# Patient Record
Sex: MALE | Race: WHITE | NOT HISPANIC OR LATINO | Employment: FULL TIME | URBAN - METROPOLITAN AREA
[De-identification: names, ages, dates, MRNs, and addresses within clinical notes are randomized per-mention and may not be internally consistent; named-entity substitution may affect disease eponyms.]

---

## 2017-01-12 ENCOUNTER — RESULTS ENCOUNTER (OUTPATIENT)
Dept: PAIN MEDICINE | Facility: CLINIC | Age: 52
End: 2017-01-12

## 2017-01-12 ENCOUNTER — OFFICE VISIT (OUTPATIENT)
Dept: PAIN MEDICINE | Facility: CLINIC | Age: 52
End: 2017-01-12

## 2017-01-12 VITALS
BODY MASS INDEX: 29.77 KG/M2 | DIASTOLIC BLOOD PRESSURE: 96 MMHG | TEMPERATURE: 97.5 F | HEIGHT: 73 IN | WEIGHT: 224.6 LBS | HEART RATE: 76 BPM | SYSTOLIC BLOOD PRESSURE: 145 MMHG | RESPIRATION RATE: 17 BRPM | OXYGEN SATURATION: 96 %

## 2017-01-12 DIAGNOSIS — G60.9 IDIOPATHIC PERIPHERAL NEUROPATHY: Primary | ICD-10-CM

## 2017-01-12 DIAGNOSIS — G60.9 IDIOPATHIC PERIPHERAL NEUROPATHY: ICD-10-CM

## 2017-01-12 PROCEDURE — 99204 OFFICE O/P NEW MOD 45 MIN: CPT | Performed by: PAIN MEDICINE

## 2017-01-12 RX ORDER — PREGABALIN 50 MG/1
CAPSULE ORAL
Qty: 90 CAPSULE | Refills: 0 | Status: SHIPPED | OUTPATIENT
Start: 2017-01-12 | End: 2017-02-03 | Stop reason: DRUGHIGH

## 2017-01-12 NOTE — MR AVS SNAPSHOT
[facility] PAIN MANAGEMENT  [phone]                    Tello Eller   1/12/2017 2:00 PM   Office Visit    Dept Phone:  [phone]   Encounter #:  [id number]    Provider:  Marlin Balnco MD   Department:  [facility] PAIN MANAGEMENT                Your Full Care Plan              Today's Medication Changes          These changes are accurate as of: 1/12/17  3:19 PM.  If you have any questions, ask your nurse or doctor.               New Medication(s)Ordered:     pregabalin 50 MG capsule   Commonly known as:  LYRICA   Take 1 cap PO QHS x 7 days; then increase to 1 cap BID x 7 days; then increase to 1 cap TID   Started by:  Marlin Blanco MD         Stop taking medication(s)listed here:     escitalopram 20 MG tablet   Commonly known as:  LEXAPRO   Stopped by:  Marlin Blanco MD                Where to Get Your Medications      You can get these medications from any pharmacy     Bring a paper prescription for each of these medications     pregabalin 50 MG capsule                  Your Updated Medication List          This list is accurate as of: 1/12/17  3:19 PM.  Always use your most recent med list.                atorvastatin 40 MG tablet   Commonly known as:  LIPITOR       DULoxetine 30 MG capsule   Commonly known as:  CYMBALTA   Take 1 capsule by mouth daily.       HYDROcodone-acetaminophen 7.5-325 MG per tablet   Commonly known as:  NORCO   Take 1 tablet by mouth 2 (Two) Times a Day As Needed for moderate pain (4-6).       lisinopril 20 MG tablet   Commonly known as:  PRINIVIL,ZESTRIL       pregabalin 50 MG capsule   Commonly known as:  LYRICA   Take 1 cap PO QHS x 7 days; then increase to 1 cap BID x 7 days; then increase to 1 cap TID               You Were Diagnosed With        Codes Comments    Idiopathic peripheral neuropathy    -  Primary ICD-10-CM: G60.9  ICD-9-CM: 356.9       Instructions     None    Patient Instructions History       "  Upcoming Appointments     Visit Type Date Time Department    NEW PATIENT 2017  2:00 PM [facility]      [employer] Signup     [facility] [employer] allows you to send messages to your doctor, view your test results, renew your prescriptions, schedule appointments, and more. To sign up, go to [employer] and click on the Sign Up Now link in the New User? box. Enter your [employer] Activation Code exactly as it appears below along with the last four digits of your Social Security Number and your Date of Birth () to complete the sign-up process. If you do not sign up before the expiration date, you must request a new code.    [employer] Activation Code: [email or web address]  Expires: 2017  3:19 PM    If you have questions, you can email [email or web address] or call [phone] to talk to our [employer] staff. Remember, [employer] is NOT to be used for urgent needs. For medical emergencies, dial 911.               Other Info from Your Visit           Allergies     Other        Reason for Visit     Foot Pain     Leg Pain           Vital Signs     Blood Pressure Pulse Temperature Respirations Height Weight    145/96 76 97.5 °F (36.4 °C) 17 73\" (185.4 cm) 224 lb 9.6 oz (102 kg)    Oxygen Saturation Body Mass Index Smoking Status             96% 29.63 kg/m2 Former Smoker         Problems and Diagnoses Noted     Nerve disorder        "

## 2017-01-12 NOTE — LETTER
January 16, 2017     Myles Eller MD  [address]  [address]  [facility] [address]    Patient: Tello Eller   YOB: 1965   Date of Visit: 1/12/2017       Dear Dr. Daphnie MD:    Thank you for referring Tello Eller to me for evaluation. Below are the relevant portions of my assessment and plan of care.    Tello was seen today for foot pain and leg pain.    Diagnoses and all orders for this visit:    Idiopathic peripheral neuropathy  -     pregabalin (LYRICA) 50 MG capsule; Take 1 cap PO QHS x 7 days; then increase to 1 cap BID x 7 days; then increase to 1 cap TID    Requested Prescriptions     Signed Prescriptions Disp Refills   • pregabalin (LYRICA) 50 MG capsule 90 capsule 0     Sig: Take 1 cap PO QHS x 7 days; then increase to 1 cap BID x 7 days; then increase to 1 cap TID     - Will order compound cream with ketamine 10% today.  He can place on painful areas up to 4 times a day as needed for pain.  Given side effects to gabapentin will start Lyrica 50 mg daily at bedtime and titrate up to 3 times a day.  Continue to titrate up in future as tolerated. (#90, 0 refills)   - Patient instructed regarding side effects and the need to avoid driving until they know how the medication changes affect them.     - Consider SCS placement in future. DVD given to the patient.   - UDS done today, will check on next visit.   -  Discussed with the patient narcotic use and how narcotics has not been FDA approved for peripheral neuropathy pain.  Would recommend him being on a neuropathic medication before being on a narcotic medication.  Continue Cymbalta at current dose.  Continue home exercise program.   Can consider sympathetic blocks in the future for his peripheral neuropathy.    - Body mass index is 29.63 kg/(m^2). By CDC definitions, the patient is overweight (BMI 25-29.9). Patient counseled on the importance of weight loss to help with overall health and pain control. Patient instructed to  attempt weight loss.   Plan: Calorie counting reduce portion size, cut out extra servings and reduce fast food intake patient has physical job which requires a lot of walking and physical activity.    Follow-up in 1 months.    Marlin Blanco MD  Pain Management                If you have questions, please do not hesitate to call me. I look forward to following Tello along with you.         Sincerely,        Marlin Blanco MD        CC: No Recipients
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
## 2017-01-24 RX ORDER — ATORVASTATIN CALCIUM 40 MG/1
40 TABLET, FILM COATED ORAL DAILY
Qty: 90 TABLET | Refills: 3 | Status: SHIPPED | OUTPATIENT
Start: 2017-01-24 | End: 2018-01-10 | Stop reason: SDUPTHER

## 2017-01-24 RX ORDER — LISINOPRIL 20 MG/1
20 TABLET ORAL DAILY
Qty: 90 TABLET | Refills: 3 | Status: SHIPPED | OUTPATIENT
Start: 2017-01-24 | End: 2018-01-25 | Stop reason: SDUPTHER

## 2017-01-27 RX ORDER — DULOXETIN HYDROCHLORIDE 30 MG/1
30 CAPSULE, DELAYED RELEASE ORAL DAILY
Qty: 30 CAPSULE | Refills: 5 | Status: SHIPPED | OUTPATIENT
Start: 2017-01-27 | End: 2017-06-16 | Stop reason: DRUGHIGH

## 2017-01-30 ENCOUNTER — TELEPHONE (OUTPATIENT)
Dept: PAIN MEDICINE | Facility: CLINIC | Age: 52
End: 2017-01-30

## 2017-02-03 ENCOUNTER — OFFICE VISIT (OUTPATIENT)
Dept: PAIN MEDICINE | Facility: CLINIC | Age: 52
End: 2017-02-03

## 2017-02-03 VITALS
HEIGHT: 73 IN | SYSTOLIC BLOOD PRESSURE: 142 MMHG | DIASTOLIC BLOOD PRESSURE: 88 MMHG | OXYGEN SATURATION: 94 % | BODY MASS INDEX: 29.9 KG/M2 | HEART RATE: 79 BPM | RESPIRATION RATE: 16 BRPM | TEMPERATURE: 98.2 F | WEIGHT: 225.6 LBS

## 2017-02-03 DIAGNOSIS — G60.9 IDIOPATHIC PERIPHERAL NEUROPATHY: Primary | ICD-10-CM

## 2017-02-03 PROCEDURE — 99213 OFFICE O/P EST LOW 20 MIN: CPT | Performed by: PAIN MEDICINE

## 2017-02-03 RX ORDER — PREGABALIN 150 MG/1
CAPSULE ORAL
Qty: 60 CAPSULE | Refills: 1 | Status: SHIPPED | OUTPATIENT
Start: 2017-02-03 | End: 2017-03-21

## 2017-02-03 RX ORDER — HYDROCODONE BITARTRATE AND ACETAMINOPHEN 7.5; 325 MG/1; MG/1
1 TABLET ORAL 2 TIMES DAILY PRN
Qty: 60 TABLET | Refills: 0 | Status: SHIPPED | OUTPATIENT
Start: 2017-02-03 | End: 2017-03-21 | Stop reason: SDUPTHER

## 2017-02-03 NOTE — PROGRESS NOTES
CHIEF COMPLAINT: Extremity Pain    HPI  Tello Eller is a 51 y.o. male.  He is here to follow up for Extremity Pain    Since last visit their pain has remain unchanged. He states that the compound cream was not approved by insurance. Was going to be $70 co-pay. Wants to discuss Lyrica. Tried to get Lyrica but co-pay was hundred of dollars. He is asking about a co-pay card and states the pharmacy would not approve it because it was a 3 times/day dosing. He is not sure if he's ready for SCS, wants to try other options first.     The patient states their pain is a 4 on a scale of 1-10.  The patient describes this pain as constant numbness, dull, ache, sharp, shooting, stabbing, pressure, tingling and throbbing. Burning, feet feel swollen. The pain is located in Foot: bilateral and lower legs and radiates up lower leg. This painful problem is aggravated by nothing specific, walking alot and is alleviated by nothing. 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 He states he is having more muscle spasms in the calves at night.    Past pain medications: Gabapentin/neurontin 300 mg qid- didn't help with pain, had mental issues   Lyrica (could not afford) - never taking.      Current pain medications: Hydrocodone 7.5/325 mg tid (not much benefit, helps him sleep) over taking and running out early   Cymbalta 20 mg am x 6 months  Advil prn  Aleve prn     Past therapies:  Physical Therapy: no  Chiropractor: yes  Massage Therapy: no  TENS: no  Neck or back surgery: no     Previous Injections: none    PEG Assessment   What number best describes your pain on average in the past week? 6  What number best describes how, during the past week, pain has interfered with your enjoyment of life? 5  What number best describes how, during the past week, pain has interfered with your general activity? 4      Current Outpatient Prescriptions:   •  atorvastatin (LIPITOR) 40 MG tablet, Take 1 tablet by mouth Daily., Disp: 90  tablet, Rfl: 3  •  DULoxetine (CYMBALTA) 30 MG capsule, Take 1 capsule by mouth Daily., Disp: 30 capsule, Rfl: 5  •  HYDROcodone-acetaminophen (NORCO) 7.5-325 MG per tablet, Take 1 tablet by mouth 2 (Two) Times a Day As Needed (pain)., Disp: 60 tablet, Rfl: 0  •  lisinopril (PRINIVIL,ZESTRIL) 20 MG tablet, Take 1 tablet by mouth Daily. For blood pressure, Disp: 90 tablet, Rfl: 3  •  diclofenac (VOLTAREN) 50 MG EC tablet, Take 1 tablet by mouth 2 (Two) Times a Day As Needed (pain)., Disp: 60 tablet, Rfl: 2  •  pregabalin (LYRICA) 150 MG capsule, Start with 1 capsule PO qhs x 1 week then increase to 1 capsule bid., Disp: 60 capsule, Rfl: 1    IMAGING  - Called [facility] and obtained imaging:  Lumbar MRI 10/1/2014:      [facility]:  The following portions of the patient's history were reviewed and updated as appropriate: problem list, past medical history, past surgery history, social history, family history, medications, and allergies    Review of Systems   Constitutional: Negative for activity change, appetite change, chills, diaphoresis, fatigue, fever and unexpected weight change.   HENT: Negative for ear pain, hearing loss and tinnitus.    Eyes: Positive for visual disturbance. Negative for pain.   Respiratory: Negative for apnea and shortness of breath.    Cardiovascular: Negative for chest pain and palpitations.   Gastrointestinal: Negative for abdominal pain, constipation, diarrhea, nausea and vomiting.   Genitourinary: Negative for difficulty urinating.   Musculoskeletal: Positive for arthralgias. Negative for gait problem.   Skin: Negative for rash and wound.   Allergic/Immunologic: Negative for immunocompromised state.   Neurological: Positive for weakness and numbness. Negative for dizziness, light-headedness and headaches.   Hematological: Does not bruise/bleed easily.   Psychiatric/Behavioral: Positive for agitation and sleep disturbance. Negative for confusion and suicidal ideas. The patient is  "nervous/anxious.    All other systems reviewed and are negative.      Vitals:    02/03/17 0802   BP: 142/88   Pulse: 79   Resp: 16   Temp: 98.2 °F (36.8 °C)   SpO2: 94%   Weight: 225 lb 9.6 oz (102 kg)   Height: 73\" (185.4 cm)   PainSc:   4   PainLoc: Foot       Physical Exam   Constitutional: He is oriented to person, place, and time. He appears well-developed and well-nourished. No distress.   HENT:   Head: Normocephalic and atraumatic.   Nose: Nose normal.   Mouth/Throat: Oropharynx is clear and moist.   Eyes: Conjunctivae and EOM are normal.   Neck: Normal range of motion. Neck supple.   Pulmonary/Chest: Effort normal. No stridor. No respiratory distress.   Musculoskeletal:        Right knee: He exhibits normal range of motion and no swelling. No tenderness found.        Left knee: He exhibits normal range of motion and no swelling. No tenderness found.        Right foot: There is tenderness. There is normal range of motion, no swelling and no deformity.        Left foot: There is tenderness. There is normal range of motion, no swelling and no deformity.   Neurological: He is alert and oriented to person, place, and time. He has normal strength. No cranial nerve deficit or sensory deficit.   Skin: Skin is warm and dry. No rash noted. He is not diaphoretic.   Psychiatric: He has a normal mood and affect. His speech is normal and behavior is normal.   Nursing note and vitals reviewed.    Ortho Exam  Neurologic Exam     Mental Status   Oriented to person, place, and time.   Speech: speech is normal     Cranial Nerves     CN III, IV, VI   Extraocular motions are normal.     Motor Exam     Strength   Strength 5/5 throughout.       Pain Management Panel     There is no flowsheet data to display.        Last UDS: 1/12/2017:  Comments: +norco only - Consistent       Date of last RONALD reviewed : 02/06/17   Comments: Consistent       Assessment/Plan   Tello was seen today for extremity pain.    Diagnoses and all " orders for this visit:    Idiopathic peripheral neuropathy  -     pregabalin (LYRICA) 150 MG capsule; Start with 1 capsule PO qhs x 1 week then increase to 1 capsule bid.  -     HYDROcodone-acetaminophen (NORCO) 7.5-325 MG per tablet; Take 1 tablet by mouth 2 (Two) Times a Day As Needed (pain).  -     diclofenac (VOLTAREN) 50 MG EC tablet; Take 1 tablet by mouth 2 (Two) Times a Day As Needed (pain).    Requested Prescriptions     Signed Prescriptions Disp Refills   • pregabalin (LYRICA) 150 MG capsule 60 capsule 1     Sig: Start with 1 capsule PO qhs x 1 week then increase to 1 capsule bid.   • HYDROcodone-acetaminophen (NORCO) 7.5-325 MG per tablet 60 tablet 0     Sig: Take 1 tablet by mouth 2 (Two) Times a Day As Needed (pain).   • diclofenac (VOLTAREN) 50 MG EC tablet 60 tablet 2     Sig: Take 1 tablet by mouth 2 (Two) Times a Day As Needed (pain).     - He is not sure if he's ready for SCS. Wants to try other options first.   - Will change dose of Lyrica to see if insurance will cover at bid dosing.   - Will increase dose from 50 mg to 150 mg to see if insurance will cover. START lyrica 150 mg qhs x 1 week, then increase to bid.  (#60, 0 refills)   - New patient UDS was consistent.   - START diclofenac 50 mg bid prn pain. (#60, 2 refills)   - Samples of zipsor given today  - Instructed he can not take diclofenac with aleve - must choose one or the other.   - CONTINUE hydrocodone 7.5/325 mg bid prn pain. He states this medication is ineffective but desire it to help him sleep and to take decrease the intensity of the pain. Will not increase to the amount, instructed not to self medicate. (#60, 0 refills)   - Side effects of NSAIDS explained as including but not limited to upset stomach, stomach ulcers, bleeding, kidney failure, fluid retention or high blood pressure.   - Narcotic agreement signed today   - Narcotic policy was discussed in detail. Patient is not going to get narcotic from any other physician and  will take medication as prescribed and is required to do random UDS. Patient will inform us of he gets narcotics from another physician for surgical procedure. Patient understands and agrees with the plan.   - Can consider sympathetic block in the future for his neuropathic pain.       Wt Readings from Last 3 Encounters:   02/03/17 225 lb 9.6 oz (102 kg)   01/12/17 224 lb 9.6 oz (102 kg)   12/29/16 224 lb 12.8 oz (102 kg)     Body mass index is 29.76 kg/(m^2). By CDC definitions, the patient is overweight (BMI 25-29.9). Patient counseled on the importance of weight loss to help with overall health and pain control. Patient instructed to attempt weight loss.   weight gain of 1 lbs over the past  1 month(s)    Intentional?  No  Plan: Calorie counting reduce portion size, cut out extra servings and reduce fast food intake    Follow-up in 4 weeks.    RONALD REPORT  As part of the patient's treatment plan, I am prescribing controlled substances. The patient has been made aware of appropriate use of such medications, including potential risk of somnolence, limited ability to drive and/or work safely, and the potential for dependence or overdose. It has also bee made clear that these medications are for use by this patient only, without concomitant use of alcohol or other substances unless prescribed.     Patient has completed prescribing agreement detailing terms of continued prescribing of controlled substances, including monitoring RONALD reports, urine drug screening, and pill counts if necessary. The patient is aware that inappropriate use will results in cessation of prescribing such medications.    RONALD report has been reviewed and scanned into the patient's chart.    History and physical exam exhibit continued safe and appropriate use of controlled substances.       Marlin Blanco MD  Pain Management

## 2017-02-03 NOTE — PATIENT INSTRUCTIONS
- He is not sure if he's ready for SCS.  Will consider in future.   - Will change dose to see if insurance will cover lyrica.   - Will increase dose from 50 mg to 150 mg to see if insurance will cover. (#60, 0 refills)   - New patient UDS was consistent. Will take over norco prescription.   - START diclofenac 50 mg bid prn pain. (#60, 2 refills)   - Samples given today of diclofenac  - Can not take diclofenac with aleve -   - CONTINUE hydrocodone 7.5/325 mg bid prn pain. (#60, 0 refills)   - Side effects of NSAIDS explained as including but not limited to upset stomach, stomach ulcers, bleeding, kidney failure, fluid retention or high blood pressure.

## 2017-03-21 ENCOUNTER — OFFICE VISIT (OUTPATIENT)
Dept: PAIN MEDICINE | Facility: CLINIC | Age: 52
End: 2017-03-21

## 2017-03-21 VITALS
RESPIRATION RATE: 18 BRPM | OXYGEN SATURATION: 97 % | WEIGHT: 235.8 LBS | TEMPERATURE: 97.4 F | HEART RATE: 97 BPM | HEIGHT: 73 IN | DIASTOLIC BLOOD PRESSURE: 73 MMHG | SYSTOLIC BLOOD PRESSURE: 131 MMHG | BODY MASS INDEX: 31.25 KG/M2

## 2017-03-21 DIAGNOSIS — M79.606 PAIN OF LOWER EXTREMITY, UNSPECIFIED LATERALITY: ICD-10-CM

## 2017-03-21 DIAGNOSIS — G89.29 OTHER CHRONIC PAIN: ICD-10-CM

## 2017-03-21 DIAGNOSIS — G60.9 IDIOPATHIC PERIPHERAL NEUROPATHY: Primary | ICD-10-CM

## 2017-03-21 PROCEDURE — 99214 OFFICE O/P EST MOD 30 MIN: CPT | Performed by: NURSE PRACTITIONER

## 2017-03-21 RX ORDER — DICLOFENAC POTASSIUM 25 MG/1
25 CAPSULE, LIQUID FILLED ORAL EVERY 8 HOURS SCHEDULED
Qty: 90 CAPSULE | Refills: 1 | Status: SHIPPED | OUTPATIENT
Start: 2017-03-21 | End: 2017-04-18

## 2017-03-21 RX ORDER — HYDROCODONE BITARTRATE AND ACETAMINOPHEN 7.5; 325 MG/1; MG/1
1 TABLET ORAL 2 TIMES DAILY PRN
Qty: 60 TABLET | Refills: 0 | Status: SHIPPED | OUTPATIENT
Start: 2017-03-21 | End: 2017-04-18 | Stop reason: SDUPTHER

## 2017-03-21 RX ORDER — AMITRIPTYLINE HYDROCHLORIDE 25 MG/1
25 TABLET, FILM COATED ORAL NIGHTLY
Qty: 30 TABLET | Refills: 1 | Status: SHIPPED | OUTPATIENT
Start: 2017-03-21 | End: 2017-04-18 | Stop reason: DRUGHIGH

## 2017-03-21 NOTE — PROGRESS NOTES
"CHIEF COMPLAINT  Follow-up for foot pain. Mr. Eller states that his foot pain has gotten worse in the last month. He states that he has been more active. Also has been out of medication. Missed appointment 3-3-17.    Initial evaluation by Simin Monaco   Tello Eller is a 51 y.o. male  who presents to the office for follow-up.He has a history of extremity pain due to PN.    Patient was initially evaluated by Dr. Marlin Blanco 1-12-17 for foot and leg pain and history of IPN.  Initial plan was to try the patient on compounded pain creme with ketamine.  Is also going to try Lyrica 50 mg 3 times a day.  However the compounded pain creme was not approved by his insurance.  The Lyrica is cost prohibitive.  Patient is not ready to proceed forward SCS wants to discuss other options first.  Cannot tolerate gabapentin and could not afford Lyrica.  It currently prescribed hydrocodone 7.5-325, Cymbalta 30 mg.  Plan is to change dosing of Lyrica to 150 mg twice a day and see if insurance will cover this.  Prescription given.  Start diclofenac 50 mg twice a day when necessary.  Continue hydrocodone 7.5-325 twice a day when necessary.  Follow-up in 4 weeks.    Complains of pain in his feet and legs. Today his pain is 4/10VAS. He notes continuous numbness and constant \"feeling of swelling.\"  Notes his feet are constantly numb. Pain goes up to mid-calf constantly, however can, increase to his calves up to knee as well. Pain is worst at night. Notes his leg starts jerking when his pain worsens. Takes Hydrocodone 7.5/325 2/day, Cymbalta 30 mg daily and Voltaren. He did try the Voltaren 50 mg but notes improved pain control with Zipsor samples. Has been without Hydrocodone for several weeks.  Notes mild to moderate relief with the regimen. Notes his pain is much worse without the pain medication. Denies any side effects from the regimen. ADL's by self.     AS been seen previously by Dr. Eller. Has tried " "gabapentin. Currently on Cymbalta. Does not remember trying Elavil or Amitriptylline.      Leg Pain    Incident onset: chronic- peripheral neuropathy. There was no injury mechanism. The pain is present in the left foot and right foot. The quality of the pain is described as burning. The pain is at a severity of 4/10. The pain is moderate. The pain has been worsening since onset. Associated symptoms include numbness (bilateral feet). Treatments tried: medication. The treatment provided moderate relief.      PEG Assessment   What number best describes your pain on average in the past week?6  What number best describes how, during the past week, pain has interfered with your enjoyment of life?6  What number best describes how, during the past week, pain has interfered with your general activity?  6    The following portions of the patient's history were reviewed and updated as appropriate: allergies, current medications, past family history, past medical history, past social history, past surgical history and problem list.    Review of Systems   Constitutional: Negative for fatigue.   HENT: Negative for congestion.    Eyes: Negative for visual disturbance.   Respiratory: Negative for cough, shortness of breath and wheezing.    Cardiovascular: Negative.    Gastrointestinal: Negative for constipation and diarrhea.   Genitourinary: Negative for difficulty urinating.   Musculoskeletal: Positive for joint swelling (bilateral foot swelling).        Bilateral foot pain   Neurological: Positive for numbness (bilateral feet). Negative for weakness.   Psychiatric/Behavioral: Positive for sleep disturbance. Negative for suicidal ideas. The patient is nervous/anxious.        Vitals:    03/21/17 1452   BP: 131/73   Pulse: 97   Resp: 18   Temp: 97.4 °F (36.3 °C)   SpO2: 97%   Weight: 235 lb 12.8 oz (107 kg)   Height: 73\" (185.4 cm)   PainSc:   4   PainLoc: Foot     Objective   Physical Exam   Constitutional: He is oriented to " person, place, and time. He appears well-developed and well-nourished. No distress.   HENT:   Head: Normocephalic and atraumatic.   Nose: Nose normal.   Mouth/Throat: Oropharynx is clear and moist.   Eyes: Conjunctivae and EOM are normal.   Neck: Trachea normal. Neck supple.   Pulmonary/Chest: Effort normal. No respiratory distress.   Musculoskeletal:        Right knee: He exhibits no swelling. No tenderness found.        Left knee: He exhibits no swelling. No tenderness found.        Right foot: There is tenderness. There is normal range of motion, no swelling and no deformity.        Left foot: There is tenderness. There is normal range of motion, no swelling and no deformity.   DT/TP pulses 2+/= bilaterally   Neurological: He is alert and oriented to person, place, and time. Gait normal.   Reflex Scores:       Patellar reflexes are 1+ on the right side and 1+ on the left side.  Skin: Skin is warm and dry. No rash noted. He is not diaphoretic.   Psychiatric: His speech is normal and behavior is normal.   Slightly anxious   Nursing note and vitals reviewed.          Assessment/Plan   Tello was seen today for foot pain.    Diagnoses and all orders for this visit:    Idiopathic peripheral neuropathy  -     HYDROcodone-acetaminophen (NORCO) 7.5-325 MG per tablet; Take 1 tablet by mouth 2 (Two) Times a Day As Needed (pain).    Other chronic pain    Pain of lower extremity, unspecified laterality    Other orders  -     Diclofenac Potassium (ZIPSOR) 25 MG capsule; Take 25 mg by mouth Every 8 (Eight) Hours.  -     amitriptyline (ELAVIL) 25 MG tablet; Take 1 tablet by mouth Every Night.      --- The oral drug screen confirmation from 1-12-17 has been reviewed and the result is appropriate based on patient history and RONALD report  --- Lyrica is cost-prohibitive. Will discontinue.   --- Trial of Zipsor. Discussed medication with the patient.  Included in this discussion was the potential for side effects and adverse  events.  Patient verbalized understanding and wished to proceed.  Prescription will be sent to pharmacy. Discontinue Voltaren generic.   --- Trial of Elavil 25 mg HS. Discussed medication with the patient.  Included in this discussion was the potential for side effects and adverse events.  Patient verbalized understanding and wished to proceed.  Prescription will be sent to pharmacy.  --- Refill Hydrocodone. Patient appears stable with current regimen. No adverse effects. Regarding continuation of opioids, there is no evidence of aberrant behavior or any red flags.  The patient continues with appropriate response to opioid therapy. ADL's remain intact by self.   --- Follow-up 1 month or sooner if needed.    Education about SCS therapy:   - This was an extended office visit in which we entered into discussion about advanced pain relieving techniques, and discussed implantable pain therapies. We discussed advanced neuromodulation in the form of Spinal Cord Stimulation. This is a reasonable therapy for patients who have exhausted basic nonnarcotic options, basic modalities and physical therapies, and do not have any other reasonable surgical options. This therapy as an alternative to long term high dose opioid therapy.   - Risks include but are not limited to bleeding, infection, injury, paralysis, nerve injury, dural puncture, and risk for postprocedural pain. Implanted equipment risks include but are not limited to lead migration, lead fracture, risk of loss of pain relieving stimulation, risk of electrical shock, and risk of system failure.   - We discussed the theory and basic science behind SCS therapy including but not limited to energy delivery and relevant anatomy, in terms that are easy to understand and also with use of illustrative devices. Spinal Cord Stimulation therapies apply an electromagnetic field to a specific area on the spinal cord (Dorsal Column) to attempt to block transmission of painful signals  from the peripheral nerves to the brain.   - We discussed that prior to trialing, I request that patients review relevant materials and perform some research, and also have a follow up education session with a device specialist from the . Also, insurance requires a presurgical psychological evaluation. When these have been completed, and all the patient's questions have been answered to their satisfaction, then we will plan to request authorization for trialing.   - We discussed the trialing process (aka Phase 1) that usually lasts a week, and the temporary nature of this trial. Trial success will determine whether or not we proceed to implant. We discussed reasonable expectations, and that I feel that consistent 50% pain relief if medically successful and is a reasonable expectation to justify moving forward to permanent implant.   - Additional risks of Phase 1 include but are not limited to bleeding on insertion, bleeding on lead removal, and procedural site soreness.  - We discussed the percutaneous surgical implant, including postsurgical restrictions, risks, and alternatives. For spinal cord stimulation implanted device (aka SCS Phase 2) there is usually a midline vertical incision for the spinally implanted leads, and also a horizontal incision in the posterior lumbar flank for implantation of the battery & computer (aka IPG). The leads are tunneled from the midline incision to the medial aspect of the battery pocket incision.   - Postoperative restrictions include limiting the following activity as much as possible for 90 days: Lifting >10 lbs, bending at the waist, stretching/reaching overhead, and twisting       RONALD REPORT    As part of the patient's treatment plan, I am prescribing controlled substances. The patient has been made aware of appropriate use of such medications, including potential risk of somnolence, limited ability to drive and/or work safely, and the potential for dependence  or overdose. It has also bee made clear that these medications are for use by this patient only, without concomitant use of alcohol or other substances unless prescribed.     Patient has completed prescribing agreement detailing terms of continued prescribing of controlled substances, including monitoring RONALD reports, urine drug screening, and pill counts if necessary. The patient is aware that inappropriate use will results in cessation of prescribing such medications.    RONALD report has been reviewed and scanned into the patient's chart.    Date of last RONALD : 3-17-17    History and physical exam exhibit continued safe and appropriate use of controlled substances.      EMR Dragon/Transcription disclaimer:   Much of this encounter note is an electronic transcription/translation of spoken language to printed text. The electronic translation of spoken language may permit erroneous, or at times, nonsensical words or phrases to be inadvertently transcribed; Although I have reviewed the note for such errors, some may still exist.

## 2017-04-18 ENCOUNTER — OFFICE VISIT (OUTPATIENT)
Dept: PAIN MEDICINE | Facility: CLINIC | Age: 52
End: 2017-04-18

## 2017-04-18 VITALS
HEIGHT: 73 IN | TEMPERATURE: 97.6 F | RESPIRATION RATE: 16 BRPM | HEART RATE: 90 BPM | BODY MASS INDEX: 30.64 KG/M2 | SYSTOLIC BLOOD PRESSURE: 135 MMHG | WEIGHT: 231.2 LBS | DIASTOLIC BLOOD PRESSURE: 84 MMHG | OXYGEN SATURATION: 100 %

## 2017-04-18 DIAGNOSIS — M79.606 PAIN OF LOWER EXTREMITY, UNSPECIFIED LATERALITY: ICD-10-CM

## 2017-04-18 DIAGNOSIS — G60.9 IDIOPATHIC PERIPHERAL NEUROPATHY: ICD-10-CM

## 2017-04-18 DIAGNOSIS — G89.29 OTHER CHRONIC PAIN: Primary | ICD-10-CM

## 2017-04-18 PROCEDURE — 99214 OFFICE O/P EST MOD 30 MIN: CPT | Performed by: NURSE PRACTITIONER

## 2017-04-18 RX ORDER — AMITRIPTYLINE HYDROCHLORIDE 10 MG/1
10 TABLET, FILM COATED ORAL NIGHTLY PRN
Qty: 30 TABLET | Refills: 1 | Status: SHIPPED | OUTPATIENT
Start: 2017-04-18 | End: 2017-06-16

## 2017-04-18 RX ORDER — HYDROCODONE BITARTRATE AND ACETAMINOPHEN 7.5; 325 MG/1; MG/1
1 TABLET ORAL 2 TIMES DAILY PRN
Qty: 60 TABLET | Refills: 0 | Status: SHIPPED | OUTPATIENT
Start: 2017-04-18 | End: 2017-05-18 | Stop reason: SDUPTHER

## 2017-04-18 NOTE — PROGRESS NOTES
"CHIEF COMPLAINT    Pt''s f/u for foot pain on 3/21/17. His pain has remain unchanged. States Amitriptylline did not help much although he slept better on it but it made him feel \"groggy\" when he woke up.     Subjective   Tello Eller is a 51 y.o. male  who presents to the office for follow-up.He has a history of IPN with extremity pain.    He was unable to get the Zipsor. His insurance would not cover this.    Complains of pain in his feet. Today his pain is 4/10VAS. Describes the pain as continuous.  Continues with Hydrocodone 7.5/325 2/day, Cymbalta 60 mg daily. He states he is no longer taking Diclofenac 50 mg BID or Zipsor. He did try Elavil 25 mg, which did help his sleep and pain at night, but made him feel groggy. He stopped taking this after approximately 1 week. Denies any side effects from his current medication regimen. The regimen helps decrease his pain by 50% for a few hours. He notes a significant difference from when he ran out of medication to re-starting the regimen.  ADL's by self. Has been taking OTC Nsaids.    Leg Pain    Incident onset: chronic- peripheral neuropathy. There was no injury mechanism. The pain is present in the left foot and right foot. The quality of the pain is described as burning. The pain is at a severity of 4/10. The pain is moderate. The pain has been worsening since onset. Associated symptoms include numbness (bilateral feet). Treatments tried: medication. The treatment provided moderate relief.      PEG Assessment   What number best describes your pain on average in the past week?6  What number best describes how, during the past week, pain has interfered with your enjoyment of life?6  What number best describes how, during the past week, pain has interfered with your general activity?  6    The following portions of the patient's history were reviewed and updated as appropriate: allergies, current medications, past family history, past medical history, past social " "history, past surgical history and problem list.    Review of Systems   Constitutional: Negative for fatigue.   HENT: Negative for congestion.    Eyes: Negative for visual disturbance.   Respiratory: Negative for cough, shortness of breath and wheezing.    Cardiovascular: Negative.  Negative for chest pain and palpitations.   Gastrointestinal: Negative for constipation, diarrhea and nausea.   Genitourinary: Negative for difficulty urinating.   Musculoskeletal: Positive for joint swelling (bilateral foot swelling).        Bilateral foot pain   Neurological: Positive for numbness (bilateral feet). Negative for dizziness, weakness, light-headedness and headaches.   Psychiatric/Behavioral: Positive for sleep disturbance. Negative for suicidal ideas. The patient is nervous/anxious.        Vitals:    04/18/17 0844   BP: 135/84   Pulse: 90   Resp: 16   Temp: 97.6 °F (36.4 °C)   SpO2: 100%   Weight: 231 lb 3.2 oz (105 kg)   Height: 73\" (185.4 cm)   PainSc:   4   PainLoc: Foot     Objective   Physical Exam   Constitutional: He is oriented to person, place, and time. He appears well-developed and well-nourished. No distress.   HENT:   Head: Normocephalic and atraumatic.   Nose: Nose normal.   Eyes: Conjunctivae and EOM are normal.   Neck: Trachea normal. Neck supple.   Pulmonary/Chest: Effort normal. No respiratory distress.   Musculoskeletal:   DT/TP pulses 2+/= bilaterally   Neurological: He is alert and oriented to person, place, and time. Gait normal.   Reflex Scores:       Patellar reflexes are 1+ on the right side and 1+ on the left side.  dysthesias of BLE's that stops near mid-calf   Skin: Skin is warm and dry. No rash noted. He is not diaphoretic.   Psychiatric: He has a normal mood and affect. His speech is normal and behavior is normal.   Nursing note and vitals reviewed.      Assessment/Plan   Tello was seen today for extremity pain.    Diagnoses and all orders for this visit:    Other chronic pain    Idiopathic " peripheral neuropathy  -     HYDROcodone-acetaminophen (NORCO) 7.5-325 MG per tablet; Take 1 tablet by mouth 2 (Two) Times a Day As Needed (pain).    Pain of lower extremity, unspecified laterality    Other orders  -     amitriptyline (ELAVIL) 10 MG tablet; Take 1 tablet by mouth At Night As Needed for Sleep.      --- The oral drug screen confirmation from 1-12-17 has been reviewed and the result is appropriate based on patient history and RONALD report  --- Refill Hydrocodone. Patient appears stable with current regimen. No adverse effects. Regarding continuation of opioids, there is no evidence of aberrant behavior or any red flags.  The patient continues with appropriate response to opioid therapy. ADL's remain intact by self.   --- Decrease of Amitriptylline 10 mg QHS PRN. Discussed medication with the patient.  Included in this discussion was the potential for side effects and adverse events.  Patient verbalized understanding and wished to proceed.  Prescription will be sent to pharmacy.   --- Follow-up 1 month with Dr. Blanco or sooner if needed.       RONALD REPORT    As part of the patient's treatment plan, I am prescribing controlled substances. The patient has been made aware of appropriate use of such medications, including potential risk of somnolence, limited ability to drive and/or work safely, and the potential for dependence or overdose. It has also bee made clear that these medications are for use by this patient only, without concomitant use of alcohol or other substances unless prescribed.     Patient has completed prescribing agreement detailing terms of continued prescribing of controlled substances, including monitoring RONALD reports, urine drug screening, and pill counts if necessary. The patient is aware that inappropriate use will results in cessation of prescribing such medications.    RONALD report has been reviewed and scanned into the patient's chart.    Date of last RONALD :  4-14-17    History and physical exam exhibit continued safe and appropriate use of controlled substances.      EMR Dragon/Transcription disclaimer:   Much of this encounter note is an electronic transcription/translation of spoken language to printed text. The electronic translation of spoken language may permit erroneous, or at times, nonsensical words or phrases to be inadvertently transcribed; Although I have reviewed the note for such errors, some may still exist.

## 2017-05-18 ENCOUNTER — OFFICE VISIT (OUTPATIENT)
Dept: PAIN MEDICINE | Facility: CLINIC | Age: 52
End: 2017-05-18

## 2017-05-18 VITALS
DIASTOLIC BLOOD PRESSURE: 70 MMHG | SYSTOLIC BLOOD PRESSURE: 110 MMHG | OXYGEN SATURATION: 100 % | TEMPERATURE: 98.3 F | HEIGHT: 73 IN | WEIGHT: 231 LBS | RESPIRATION RATE: 16 BRPM | HEART RATE: 69 BPM | BODY MASS INDEX: 30.62 KG/M2

## 2017-05-18 DIAGNOSIS — G60.9 IDIOPATHIC PERIPHERAL NEUROPATHY: Primary | ICD-10-CM

## 2017-05-18 PROCEDURE — 99214 OFFICE O/P EST MOD 30 MIN: CPT | Performed by: PAIN MEDICINE

## 2017-05-18 RX ORDER — HYDROCODONE BITARTRATE AND ACETAMINOPHEN 7.5; 325 MG/1; MG/1
1 TABLET ORAL 2 TIMES DAILY PRN
Qty: 60 TABLET | Refills: 0 | Status: SHIPPED | OUTPATIENT
Start: 2017-05-18 | End: 2017-06-16 | Stop reason: SDUPTHER

## 2017-06-16 ENCOUNTER — OFFICE VISIT (OUTPATIENT)
Dept: PAIN MEDICINE | Facility: CLINIC | Age: 52
End: 2017-06-16

## 2017-06-16 VITALS
TEMPERATURE: 98.2 F | BODY MASS INDEX: 31.28 KG/M2 | SYSTOLIC BLOOD PRESSURE: 124 MMHG | RESPIRATION RATE: 16 BRPM | DIASTOLIC BLOOD PRESSURE: 83 MMHG | HEIGHT: 73 IN | OXYGEN SATURATION: 95 % | HEART RATE: 84 BPM | WEIGHT: 236 LBS

## 2017-06-16 DIAGNOSIS — G60.9 IDIOPATHIC PERIPHERAL NEUROPATHY: Primary | ICD-10-CM

## 2017-06-16 PROCEDURE — 99214 OFFICE O/P EST MOD 30 MIN: CPT | Performed by: PAIN MEDICINE

## 2017-06-16 RX ORDER — DULOXETIN HYDROCHLORIDE 20 MG/1
20 CAPSULE, DELAYED RELEASE ORAL DAILY
Qty: 60 CAPSULE | Refills: 0 | Status: SHIPPED | OUTPATIENT
Start: 2017-06-16 | End: 2017-07-14

## 2017-06-16 RX ORDER — HYDROCODONE BITARTRATE AND ACETAMINOPHEN 7.5; 325 MG/1; MG/1
TABLET ORAL
Qty: 70 TABLET | Refills: 0 | Status: SHIPPED | OUTPATIENT
Start: 2017-06-16 | End: 2017-07-14 | Stop reason: SDUPTHER

## 2017-06-16 NOTE — PATIENT INSTRUCTIONS
- will decrease cymbalta slowly to see if he can wean off slowly.   - STOP cymbalta 30 mg.   - START cymbalta 20 mg daily. Take for 5 days, then decrease down to every other day, then discontinue. If increased pain, restart medication.

## 2017-06-16 NOTE — PROGRESS NOTES
CHIEF COMPLAINT: Foot Pain    HPI  Tello Eller is a 51 y.o. male.  He is here to follow up for Foot Pain     Since last visit their pain has remain unchanged.     The patient states their pain is a 4 on a scale of 1-10.  The patient describes this pain as constant numbness, dull, ache and tingling. The pain is located in bilateral feet starting at ankle. This painful problem is aggravated by physical activity, work and standing on concrete for too long and is alleviated by relaxation, pain medication and sitting, certain shoes.    Went without cymbalta over the weekend due to thinking it was not working. Increased dizziness, motion sickness, side effects stopped after resuming. No real change in pain but didn't like the way it made him feel. Wishes to discontinue to see if it his helping but does not want side effects.     Stopped Elavil due to side effects of drowsiness.     Past pain medications: Gabapentin/neurontin 300 mg qid- didn't help with pain, had mental issues   Lyrica (could not afford) - never taking.   Diclofenac/zipsor  Elavil 25 mg - drowsy  Compound cream - no approved      Current pain medications: Hydrocodone 7.5/325 mg bid-tid (not much benefit, helps him sleep) over taking and running out early - last dose was this am.   Cymbalta 30 mg am x 6 months  Elavil 10 mg  advil/aleve     Past therapies:  Physical Therapy: no  Chiropractor: yes  Massage Therapy: no  TENS: no  Neck or back surgery: no     Previous Injections: none    PEG Assessment   What number best describes your pain on average in the past week? 6  What number best describes how, during the past week, pain has interfered with your enjoyment of life? 6  What number best describes how, during the past week, pain has interfered with your general activity? 6      Current Outpatient Prescriptions:   •  atorvastatin (LIPITOR) 40 MG tablet, Take 1 tablet by mouth Daily., Disp: 90 tablet, Rfl: 3  •  HYDROcodone-acetaminophen (NORCO)  "7.5-325 MG per tablet, Take 1 tablet PO 2-3 times a day as needed for pain, Disp: 70 tablet, Rfl: 0  •  lisinopril (PRINIVIL,ZESTRIL) 20 MG tablet, Take 1 tablet by mouth Daily. For blood pressure, Disp: 90 tablet, Rfl: 3  •  diclofenac (VOLTAREN) 50 MG EC tablet, Take 1 tablet by mouth 2 (Two) Times a Day As Needed (pain)., Disp: 60 tablet, Rfl: 2  •  DULoxetine (CYMBALTA) 20 MG capsule, Take 1 capsule by mouth Daily., Disp: 60 capsule, Rfl: 0    IMAGING  [facility] for a MRI of Lumbar spine, 2 years ago     PFSH:  The following portions of the patient's history were reviewed and updated as appropriate: problem list, past medical history, past surgery history, social history, family history, medications, and allergies    Review of Systems   Constitutional: Negative for activity change, appetite change and fatigue.   HENT: Negative for congestion.    Eyes: Negative for visual disturbance.   Respiratory: Negative for cough, shortness of breath and wheezing.    Cardiovascular: Negative.  Negative for chest pain and palpitations.   Gastrointestinal: Negative for constipation, diarrhea and nausea.   Genitourinary: Negative for difficulty urinating.   Musculoskeletal: Negative for joint swelling (pt states his feet feel swollen but they are not swollen).        Bilateral foot pain   Neurological: Positive for numbness (bilateral feet). Negative for dizziness, weakness, light-headedness and headaches.   Psychiatric/Behavioral: Negative for sleep disturbance and suicidal ideas. The patient is not nervous/anxious.    All other systems reviewed and are negative.      Vitals:    06/16/17 0854   BP: 124/83   Pulse: 84   Resp: 16   Temp: 98.2 °F (36.8 °C)   SpO2: 95%   Weight: 236 lb (107 kg)   Height: 73\" (185.4 cm)   PainSc: 4  Comment: Bilateral feet pain ranges from 4-8/10   PainLoc: Foot       Physical Exam   Constitutional: He is oriented to person, place, and time. He appears well-developed and well-nourished. No " distress.   HENT:   Head: Normocephalic and atraumatic.   Nose: Nose normal.   Eyes: Conjunctivae and EOM are normal.   Neck: Trachea normal. Neck supple.   Pulmonary/Chest: Effort normal. No respiratory distress.   Musculoskeletal:        Right foot: There is tenderness. There is normal range of motion and no deformity.        Left foot: There is tenderness. There is normal range of motion and no deformity.   Neurological: He is alert and oriented to person, place, and time. Gait normal. Gait normal.   dysthesias of BLE's that stops near mid-calf   Skin: Skin is warm and dry. No rash noted. He is not diaphoretic.   Psychiatric: He has a normal mood and affect. His speech is normal and behavior is normal.   Nursing note and vitals reviewed.    Ortho Exam  Neurologic Exam     Mental Status   Oriented to person, place, and time.   Speech: speech is normal     Cranial Nerves     CN III, IV, VI   Extraocular motions are normal.     Sensory Exam   Right leg light touch: decreased from knee  Left leg light touch: decreased from knee    Gait, Coordination, and Reflexes     Gait  Gait: normal      No results found for: POCMETH, POCAMPHET, POCBARBITUR, POCBENZO, POCCOCAINE, POCMETHADO, POCOPIATES, POCOXYCODO, POCPHENCYC, POCPROPOXY, POCTHC, POCTRICYC]    Last UDS results reviewed: 06/16/17   Last UDS: 5/25/17  Comments: INCONSISTENT - NO MEDS      Date of last RONALD reviewed : 06/16/17   Comments: Consistent     Assessment/Plan   Tello was seen today for foot pain.    Diagnoses and all orders for this visit:    Idiopathic peripheral neuropathy  -     DULoxetine (CYMBALTA) 20 MG capsule; Take 1 capsule by mouth Daily.  -     diclofenac (VOLTAREN) 50 MG EC tablet; Take 1 tablet by mouth 2 (Two) Times a Day As Needed (pain).  -     HYDROcodone-acetaminophen (NORCO) 7.5-325 MG per tablet; Take 1 tablet PO 2-3 times a day as needed for pain    Requested Prescriptions     Signed Prescriptions Disp Refills   • DULoxetine  (CYMBALTA) 20 MG capsule 60 capsule 0     Sig: Take 1 capsule by mouth Daily.   • diclofenac (VOLTAREN) 50 MG EC tablet 60 tablet 2     Sig: Take 1 tablet by mouth 2 (Two) Times a Day As Needed (pain).   • HYDROcodone-acetaminophen (NORCO) 7.5-325 MG per tablet 70 tablet 0     Sig: Take 1 tablet PO 2-3 times a day as needed for pain     - will decrease cymbalta slowly to see if he can wean off slowly and without side effects.   - STOP cymbalta 30 mg  - START cymbalta 20 mg daily. Take for 5 days, then decrease down to every other day, then discontinue. If increased pain, restart medication.   - Discussed with the patient regarding the etiology of their pain. Informed them that they would likely benefit from a bilateral L4 lumbar sympathetic nerve block.  The procedure was described in detail and the risks, benefits and alternatives were discussed at the last visit. He is to call and ask about cost before proceeding. He will call to schedule.   - Last UDS performed was reviewed and INCONSISTENT.  No meds but he was out for one week. Discussed over taking his medication and how we can not properly monitor if there is no medication in his system. Will increase norco given he is requiring more but will obtain a UDS at NEXT visit and medication must be in his system.   - Random urine drug screen per office policy AT NEXT VISIT.   - Consider SCS placement in future.   - STOP elavil 10 mg due to side effects of drowsiness the next morning.   - Continue home exercise program.   - INCREASE norco 7.5/325 mg from bid to 2-3 times/day prn pain. Will increase number from #60 to #70.   - CONTINUE diclofenac 50 mg bid prn pain. (#60, 2 refills)   - Side effects of NSAIDS explained as including but not limited to upset stomach, stomach ulcers, bleeding, kidney failure, fluid retention or high blood pressure.   - Discussed with the patient regarding long-term side effects of opioids including but not limited to opioid induced  hormonal suppression, hyperalgesia, possible opioid induced altered immune system, and elevated risk of myocardial infarction.     Wt Readings from Last 3 Encounters:   06/16/17 236 lb (107 kg)   05/18/17 231 lb (105 kg)   04/18/17 231 lb 3.2 oz (105 kg)     Body mass index is 31.14 kg/(m^2). Patient counseled on the importance of weight loss to help with overall health and pain control. Patient instructed to attempt weight loss.   Plan: Calorie counting  increase physical activity, reduce portion size, cut out extra servings and reduce fast food intake    Follow-up in 1 month.    RONALD REPORT  As part of the patient's treatment plan, I am prescribing controlled substances. The patient has been made aware of appropriate use of such medications, including potential risk of somnolence, limited ability to drive and/or work safely, and the potential for dependence or overdose. It has also bee made clear that these medications are for use by this patient only, without concomitant use of alcohol or other substances unless prescribed.     Patient has completed prescribing agreement detailing terms of continued prescribing of controlled substances, including monitoring RONALD reports, urine drug screening, and pill counts if necessary. The patient is aware that inappropriate use will results in cessation of prescribing such medications.    RONALD report has been reviewed and scanned into the patient's chart.    History and physical exam exhibit continued safe and appropriate use of controlled substances.     Marlin Blanco MD  Pain Management

## 2017-07-14 ENCOUNTER — OFFICE VISIT (OUTPATIENT)
Dept: PAIN MEDICINE | Facility: CLINIC | Age: 52
End: 2017-07-14

## 2017-07-14 VITALS
WEIGHT: 234.8 LBS | BODY MASS INDEX: 31.12 KG/M2 | SYSTOLIC BLOOD PRESSURE: 127 MMHG | RESPIRATION RATE: 18 BRPM | HEART RATE: 87 BPM | OXYGEN SATURATION: 96 % | HEIGHT: 73 IN | DIASTOLIC BLOOD PRESSURE: 80 MMHG | TEMPERATURE: 96.7 F

## 2017-07-14 DIAGNOSIS — G89.29 OTHER CHRONIC PAIN: Primary | ICD-10-CM

## 2017-07-14 DIAGNOSIS — R11.0 NAUSEA: ICD-10-CM

## 2017-07-14 DIAGNOSIS — G60.9 IDIOPATHIC PERIPHERAL NEUROPATHY: ICD-10-CM

## 2017-07-14 PROCEDURE — 99213 OFFICE O/P EST LOW 20 MIN: CPT | Performed by: PAIN MEDICINE

## 2017-07-14 RX ORDER — HYDROCODONE BITARTRATE AND ACETAMINOPHEN 7.5; 325 MG/1; MG/1
TABLET ORAL
Qty: 70 TABLET | Refills: 0 | Status: SHIPPED | OUTPATIENT
Start: 2017-07-14 | End: 2017-09-08 | Stop reason: SDUPTHER

## 2017-07-14 RX ORDER — ONDANSETRON 4 MG/1
4 TABLET, FILM COATED ORAL EVERY 8 HOURS PRN
Qty: 20 TABLET | Refills: 0 | Status: SHIPPED | OUTPATIENT
Start: 2017-07-14 | End: 2017-09-08

## 2017-07-14 RX ORDER — DULOXETIN HYDROCHLORIDE 30 MG/1
CAPSULE, DELAYED RELEASE ORAL
Refills: 5 | COMMUNITY
Start: 2017-06-29 | End: 2017-07-14 | Stop reason: DRUGHIGH

## 2017-07-14 RX ORDER — DULOXETIN HYDROCHLORIDE 20 MG/1
20 CAPSULE, DELAYED RELEASE ORAL DAILY
Qty: 30 CAPSULE | Refills: 0 | Status: SHIPPED | OUTPATIENT
Start: 2017-07-14 | End: 2017-08-11

## 2017-07-14 NOTE — PROGRESS NOTES
CHIEF COMPLAINT: Foot Pain    HPI  Tello Eller is a 51 y.o. male.  He is here to follow up for Foot Pain    Since last visit their pain has remain unchanged. Increased number of tablets of norco at last visit with good results. Did not run out of medication over last month. Has a few tablets left.     The patient states their pain is a 5 on a scale of 1-10. The patient describes this pain as constant numbness, dull, ache and tingling. The pain is located in bilateral feet starting at ankle. This painful problem is aggravated by physical activity, work and standing on concrete for too long and is alleviated by relaxation, pain medication and sitting, certain shoes.    Wants to stop Cymbalta but did not receive decreased dose of 20 mg last months. He gets nauseous with decreasing dose.     Past pain medications: Gabapentin/neurontin 300 mg qid- didn't help with pain, had mental issues   Lyrica (could not afford) - never taking.   Diclofenac/zipsor  Elavil 25 mg - drowsy  Compound cream - no approved      Current pain medications: Hydrocodone 7.5/325 mg bid-tid (not much benefit, helps him sleep) over taking and running out early - last dose was this am.   Cymbalta 30 mg am x 6 months  Elavil 10 mg  advil/aleve      Past therapies:  Physical Therapy: no  Chiropractor: yes  Massage Therapy: no  TENS: no  Neck or back surgery: no      Previous Injections: none    PEG Assessment   What number best describes your pain on average in the past week? 6  What number best describes how, during the past week, pain has interfered with your enjoyment of life? 5  What number best describes how, during the past week, pain has interfered with your general activity? 6      Current Outpatient Prescriptions:   •  atorvastatin (LIPITOR) 40 MG tablet, Take 1 tablet by mouth Daily., Disp: 90 tablet, Rfl: 3  •  diclofenac (VOLTAREN) 50 MG EC tablet, Take 1 tablet by mouth 2 (Two) Times a Day As Needed (pain)., Disp: 60 tablet, Rfl: 2  •  " HYDROcodone-acetaminophen (NORCO) 7.5-325 MG per tablet, Take 1 tablet PO 2-3 times a day as needed for pain, Disp: 70 tablet, Rfl: 0  •  lisinopril (PRINIVIL,ZESTRIL) 20 MG tablet, Take 1 tablet by mouth Daily. For blood pressure, Disp: 90 tablet, Rfl: 3  •  DULoxetine (CYMBALTA) 20 MG capsule, Take 1 capsule by mouth Daily., Disp: 30 capsule, Rfl: 0  •  ondansetron (ZOFRAN) 4 MG tablet, Take 1 tablet by mouth Every 8 (Eight) Hours As Needed for Nausea or Vomiting., Disp: 20 tablet, Rfl: 0    IMAGING  [facility] for a MRI of Lumbar spine, 2 years ago     PFSH:  The following portions of the patient's history were reviewed and updated as appropriate: problem list, past medical history, past surgery history, social history, family history, medications, and allergies    Review of Systems   Constitutional: Negative for fatigue.   HENT: Negative for congestion.    Eyes: Negative for visual disturbance.   Respiratory: Negative for cough, shortness of breath and wheezing.    Cardiovascular: Negative.    Gastrointestinal: Negative for constipation and diarrhea.   Genitourinary: Negative for difficulty urinating.   Musculoskeletal: Negative for arthralgias.   Neurological: Negative for weakness and numbness.   Psychiatric/Behavioral: Negative for sleep disturbance and suicidal ideas. The patient is not nervous/anxious.        Vitals:    07/14/17 0848   BP: 127/80   Pulse: 87   Resp: 18   Temp: 96.7 °F (35.9 °C)   SpO2: 96%   Weight: 234 lb 12.8 oz (107 kg)   Height: 73\" (185.4 cm)   PainSc:   5   PainLoc: Foot       Physical Exam   Constitutional: He is oriented to person, place, and time. He appears well-developed and well-nourished. No distress.   HENT:   Head: Normocephalic and atraumatic.   Nose: Nose normal.   Eyes: Conjunctivae and EOM are normal.   Neck: Trachea normal. Neck supple.   Pulmonary/Chest: Effort normal. No respiratory distress.   Musculoskeletal:        Right foot: There is tenderness. There is normal " range of motion and no deformity.        Left foot: There is tenderness. There is normal range of motion and no deformity.   Neurological: He is alert and oriented to person, place, and time. Gait normal.   dysthesias of BLE's that stops near mid-calf   Skin: Skin is warm and dry. No rash noted. He is not diaphoretic.   Psychiatric: He has a normal mood and affect. His speech is normal and behavior is normal.   Nursing note and vitals reviewed.    Ortho Exam  Neurologic Exam     Mental Status   Oriented to person, place, and time.   Speech: speech is normal     Cranial Nerves     CN III, IV, VI   Extraocular motions are normal.       No results found for: POCMETH, POCAMPHET, POCBARBITUR, POCBENZO, POCCOCAINE, POCMETHADO, POCOPIATES, POCOXYCODO, POCPHENCYC, POCPROPOXY, POCTHC, POCTRICYC  Last UDS results reviewed: 07/17/17   Last UDS: 5/25/17  Comments: INCONSISTENT - NO MEDS    Date of last RONALD reviewed : 07/17/17   Comments: Consistent     Assessment/Plan   Tello was seen today for foot pain.    Diagnoses and all orders for this visit:    Other chronic pain  -     Oral Fluid Drug Screen; Future  -     DULoxetine (CYMBALTA) 20 MG capsule; Take 1 capsule by mouth Daily.    Idiopathic peripheral neuropathy  -     Oral Fluid Drug Screen; Future  -     HYDROcodone-acetaminophen (NORCO) 7.5-325 MG per tablet; Take 1 tablet PO 2-3 times a day as needed for pain  -     DULoxetine (CYMBALTA) 20 MG capsule; Take 1 capsule by mouth Daily.    Nausea  -     ondansetron (ZOFRAN) 4 MG tablet; Take 1 tablet by mouth Every 8 (Eight) Hours As Needed for Nausea or Vomiting.    Requested Prescriptions     Signed Prescriptions Disp Refills   • HYDROcodone-acetaminophen (NORCO) 7.5-325 MG per tablet 70 tablet 0     Sig: Take 1 tablet PO 2-3 times a day as needed for pain   • ondansetron (ZOFRAN) 4 MG tablet 20 tablet 0     Sig: Take 1 tablet by mouth Every 8 (Eight) Hours As Needed for Nausea or Vomiting.   • DULoxetine (CYMBALTA)  20 MG capsule 30 capsule 0     Sig: Take 1 capsule by mouth Daily.     - CONTINUE norco 7.5/325 mg from bid to 2-3 times/day prn pain. Will increase number from #60 to #70.   - will decrease cymbalta slowly to see if he can wean off slowly and without side effects.   - STOP cymbalta 30 mg  - START cymbalta 20 mg daily. Take for 5 days, then decrease down to every other day, then discontinue. If increased pain, restart medication.   - CONTINUE diclofenac 50 mg bid prn pain. No refills needed today.    - Side effects of NSAIDS explained as including but not limited to upset stomach, stomach ulcers, bleeding, kidney failure, fluid retention or high blood pressure.  - Discussed with the patient regarding the etiology of their pain. Informed them that they would likely benefit from a bilateral L4 lumbar sympathetic nerve block.  The procedure was described in detail and the risks, benefits and alternatives were discussed at the last visit. He is to call and ask about cost before proceeding. He will call to schedule.   - Last UDS performed was reviewed and INCONSISTENT. No meds but he was out for one week. Discussed over taking his medication and how we can not properly monitor if there is no medication in his system. Will increase norco given he is requiring more but will obtain a UDS at NEXT visit and medication must be in his system.   - UDS done today, will check on next visit. Should be positive for medication.   - Consider SCS placement in future.   - Continue home exercise program.   - Discussed with the patient regarding long-term side effects of opioids including but not limited to opioid induced hormonal suppression, hyperalgesia, possible opioid induced altered immune system, and elevated risk of myocardial infarction.     Wt Readings from Last 3 Encounters:   07/14/17 234 lb 12.8 oz (107 kg)   06/16/17 236 lb (107 kg)   05/18/17 231 lb (105 kg)     Body mass index is 30.98 kg/(m^2). Patient counseled on the  importance of weight loss to help with overall health and pain control. Patient instructed to attempt weight loss.   Plan: Calorie counting  increase physical activity, reduce portion size, cut out extra servings and reduce fast food intake    Follow-up in 1 month.    RONALD REPORT  As part of the patient's treatment plan, I am prescribing controlled substances. The patient has been made aware of appropriate use of such medications, including potential risk of somnolence, limited ability to drive and/or work safely, and the potential for dependence or overdose. It has also bee made clear that these medications are for use by this patient only, without concomitant use of alcohol or other substances unless prescribed.     Patient has completed prescribing agreement detailing terms of continued prescribing of controlled substances, including monitoring RONALD reports, urine drug screening, and pill counts if necessary. The patient is aware that inappropriate use will results in cessation of prescribing such medications.    RONALD report has been reviewed and scanned into the patient's chart.    History and physical exam exhibit continued safe and appropriate use of controlled substances.       Marlin Blanco MD  Pain Management

## 2017-08-11 ENCOUNTER — OFFICE VISIT (OUTPATIENT)
Dept: PAIN MEDICINE | Facility: CLINIC | Age: 52
End: 2017-08-11

## 2017-08-11 VITALS
RESPIRATION RATE: 16 BRPM | WEIGHT: 248.4 LBS | OXYGEN SATURATION: 99 % | SYSTOLIC BLOOD PRESSURE: 130 MMHG | DIASTOLIC BLOOD PRESSURE: 84 MMHG | HEIGHT: 73 IN | TEMPERATURE: 98 F | BODY MASS INDEX: 32.92 KG/M2 | HEART RATE: 92 BPM

## 2017-08-11 DIAGNOSIS — G89.29 OTHER CHRONIC PAIN: ICD-10-CM

## 2017-08-11 DIAGNOSIS — R68.89 WITHDRAWAL COMPLAINT: ICD-10-CM

## 2017-08-11 DIAGNOSIS — G60.9 IDIOPATHIC PERIPHERAL NEUROPATHY: Primary | ICD-10-CM

## 2017-08-11 LAB
POC AMPHETAMINES: NEGATIVE
POC BARBITURATES: NEGATIVE
POC BENZODIAZEPHINES: NEGATIVE
POC COCAINE: NEGATIVE
POC METHADONE: NEGATIVE
POC METHAMPHETAMINE SCREEN URINE: NEGATIVE
POC OPIATES: POSITIVE
POC OXYCODONE: NEGATIVE
POC PHENCYCLIDINE: NEGATIVE
POC PROPOXYPHENE: NEGATIVE
POC THC: NEGATIVE
POC TRICYCLIC ANTIDEPRESSANTS: NEGATIVE

## 2017-08-11 PROCEDURE — 99214 OFFICE O/P EST MOD 30 MIN: CPT | Performed by: PAIN MEDICINE

## 2017-08-11 PROCEDURE — 80305 DRUG TEST PRSMV DIR OPT OBS: CPT | Performed by: PAIN MEDICINE

## 2017-08-11 RX ORDER — CHLORAL HYDRATE 500 MG
CAPSULE ORAL
COMMUNITY
End: 2018-03-29 | Stop reason: HOSPADM

## 2017-08-11 RX ORDER — UBIDECARENONE 75 MG
50 CAPSULE ORAL DAILY
COMMUNITY
End: 2018-03-29 | Stop reason: HOSPADM

## 2017-08-11 NOTE — PROGRESS NOTES
CHIEF COMPLAINT: Pain    HPI  Tello Eller is a 51 y.o. male.  He is here to follow up for Pain    Since last visit their pain has worsened . Pt states he has been off cymbalta and experiencing withdrawal symptoms. Decreased cymbalta to minimal dose then down to every other day then stopped. He has been off of it for 12 days. Currently experiences insomnia, increased hunger, nausea, vertigo. Prescribed zofran for nausea but minimal help.   Now with increased aches, pains from bilateral hips down to feet but not sure if it is from cymbalta withdrawals vs weight gain of 12 pounds.     He has not been taking hydrocodone as consistently as it adds to his nausea if taken during the day. He has been using fish oil and turmeric to try to help with joint pain- purchased from [employer] started 3 days ago- no change yet.    The patient states their pain is a 8 on a scale of 1-10. The patient describes this pain as constant numbness, tingling in bilateral bilateral feet starting at ankle- this problem is not his main concern today. This painful problem is aggravated by physical activity, work and standing on concrete for too long and is alleviated by relaxation, pain medication and sitting, certain shoes.    Past pain medications: Gabapentin/neurontin 300 mg qid- didn't help with pain, had mental issues   Lyrica (could not afford) - never taking.   Diclofenac/zipsor  Elavil 25 mg - drowsy  Compound cream - no approved  Diclofenac bid - didn't work as well as aleve  Cymbalta 30 mg am x 6 months      Current pain medications:   Hydrocodone 7.5/325 mg every other day (not much benefit, helps him sleep) - last dose was this am.   Elavil 10 mg  advil/aleve      Past therapies:  Physical Therapy: no  Chiropractor: yes  Massage Therapy: no  TENS: no  Neck or back surgery: no      Previous Injections: none    PEG Assessment   What number best describes your pain on average in the past week? 8  What number best describes how, during the  past week, pain has interfered with your enjoyment of life? 8  What number best describes how, during the past week, pain has interfered with your general activity? 8      Current Outpatient Prescriptions:   •  atorvastatin (LIPITOR) 40 MG tablet, Take 1 tablet by mouth Daily., Disp: 90 tablet, Rfl: 3  •  diclofenac (VOLTAREN) 50 MG EC tablet, Take 1 tablet by mouth 2 (Two) Times a Day As Needed (pain)., Disp: 60 tablet, Rfl: 2  •  lisinopril (PRINIVIL,ZESTRIL) 20 MG tablet, Take 1 tablet by mouth Daily. For blood pressure, Disp: 90 tablet, Rfl: 3  •  Multiple Vitamins-Minerals (MULTIVITAMIN ADULT PO), Take  by mouth., Disp: , Rfl:   •  Omega-3 Fatty Acids (FISH OIL) 1000 MG capsule capsule, Take  by mouth Daily With Breakfast., Disp: , Rfl:   •  ondansetron (ZOFRAN) 4 MG tablet, Take 1 tablet by mouth Every 8 (Eight) Hours As Needed for Nausea or Vomiting., Disp: 20 tablet, Rfl: 0  •  TURMERIC PO, Take  by mouth., Disp: , Rfl:   •  vitamin B-12 (CYANOCOBALAMIN) 100 MCG tablet, Take 50 mcg by mouth Daily., Disp: , Rfl:   •  HYDROcodone-acetaminophen (NORCO) 7.5-325 MG per tablet, Take 1 tablet PO 2-3 times a day as needed for pain, Disp: 70 tablet, Rfl: 0    IMAGING  [facility] for a MRI of Lumbar spine, 2 years ago     PFSH:  The following portions of the patient's history were reviewed and updated as appropriate: problem list, past medical history, past surgery history, social history, family history, medications, and allergies    Review of Systems   Constitutional: Negative for chills, fatigue and fever.   HENT: Negative for congestion.    Eyes: Negative for visual disturbance.   Respiratory: Negative for cough, shortness of breath and wheezing.    Cardiovascular: Negative.  Negative for chest pain.   Gastrointestinal: Positive for nausea. Negative for constipation, diarrhea and vomiting.   Genitourinary: Negative for difficulty urinating.   Musculoskeletal: Positive for arthralgias.   Neurological: Positive for  "dizziness, weakness (pt thinks due to withdrawal), light-headedness, numbness (feet) and headaches.   Psychiatric/Behavioral: Negative for confusion, hallucinations, sleep disturbance and suicidal ideas. The patient is nervous/anxious.    All other systems reviewed and are negative.      Vitals:    08/11/17 0848   BP: 130/84   Pulse: 92   Resp: 16   Temp: 98 °F (36.7 °C)   SpO2: 99%   Weight: 248 lb 6.4 oz (113 kg)   Height: 73\" (185.4 cm)   PainSc:   8   PainLoc: Generalized       Physical Exam   Constitutional: He is oriented to person, place, and time. Vital signs are normal. He appears well-developed and well-nourished.  Non-toxic appearance. He appears distressed.   HENT:   Head: Normocephalic and atraumatic.   Nose: Nose normal.   Eyes: Conjunctivae and EOM are normal.   Neck: Trachea normal. Neck supple.   Pulmonary/Chest: Effort normal. No respiratory distress.   Musculoskeletal:        Right knee: Tenderness found.        Left knee: Tenderness found.        Right foot: There is tenderness. There is normal range of motion and no deformity.        Left foot: There is tenderness. There is normal range of motion and no deformity.   Neurological: He is alert and oriented to person, place, and time. Gait normal.   dysthesias of BLE's that stops near mid-calf   Skin: Skin is warm and dry. No rash noted. He is not diaphoretic.   Psychiatric: His speech is normal and behavior is normal. His mood appears anxious.   Nursing note and vitals reviewed.    Ortho Exam  Neurologic Exam     Mental Status   Oriented to person, place, and time.   Speech: speech is normal     Cranial Nerves     CN III, IV, VI   Extraocular motions are normal.       No results found for: POCMETH, POCAMPHET, POCBARBITUR, POCBENZO, POCCOCAINE, POCMETHADO, POCOPIATES, POCOXYCODO, POCPHENCYC, POCPROPOXY, POCTHC, POCTRICYC  Last UDS results reviewed: 08/11/17   Last UDS: 7/14/2017  Comments: INCONSISTENT - NO MEDS      Date of last RONALD reviewed : " 08/11/17   Comments: Consistent     Assessment/Plan   Tello was seen today for pain.    Diagnoses and all orders for this visit:    Idiopathic peripheral neuropathy    Withdrawal complaint    Other chronic pain      Requested Prescriptions      No prescriptions requested or ordered in this encounter     - Continue to stay off cymbalta. Withdrawal symptoms should continue to improve. Do not restart the medication. He has seen PCP about medication and he states there is nothing he can assist with either.   - CONTINUE norco 7.5/325 mg but continue at decreased dose of 1 every other day. Has enough medication left, does not need refill today. Hopefully it will last him another month.   - Continue aleve/ibuprofen for joint pain. Discontinue diclofenac while on aleve.   - Side effects of NSAIDS explained as including but not limited to upset stomach, stomach ulcers, bleeding, kidney failure, fluid retention or high blood pressure.  - Discussed with the patient regarding the etiology of their pain. Informed them that they would likely benefit from a bilateral L4 lumbar sympathetic nerve block.  The procedure was described in detail and the risks, benefits and alternatives were discussed at the last visit. He is to call and ask about cost before proceeding. He will call to schedule.   - Last two UDS performed was reviewed and INCONSISTENT. No meds but he was out for one week.   - UDS done today, will check on next visit. Should be positive for medication- last dose was this am.   - Complaints of generalized aches and pains. Unsure if they are from the cymbalta withdrawal vs acute increase in weight. Will let withdrawal symptoms resolve and then re-evaluate at next visit. Continue aleve for this pain.   - Consider SCS placement in future.   - Continue home exercise program.   - Discussed with the patient regarding long-term side effects of opioids including but not limited to opioid induced hormonal suppression,  hyperalgesia, possible opioid induced altered immune system, and elevated risk of myocardial infarction.     Wt Readings from Last 3 Encounters:   08/11/17 248 lb 6.4 oz (113 kg)   07/14/17 234 lb 12.8 oz (107 kg)   06/16/17 236 lb (107 kg)     Body mass index is 32.77 kg/(m^2).  Patient counseled on the importance of weight loss to help with overall health and pain control. Patient instructed to attempt weight loss.   Plan: Calorie counting  increase physical activity, reduce portion size, cut out extra servings and reduce fast food intake currently on protein diet. Increased appetite from cymbalta withdrawal.     Follow-up in 1 month.    Marlin Blanco MD  Pain Management    RONALD REPORT  As part of the patient's treatment plan, I am prescribing controlled substances. The patient has been made aware of appropriate use of such medications, including potential risk of somnolence, limited ability to drive and/or work safely, and the potential for dependence or overdose. It has also bee made clear that these medications are for use by this patient only, without concomitant use of alcohol or other substances unless prescribed.   Patient has completed prescribing agreement detailing terms of continued prescribing of controlled substances, including monitoring RONALD reports, urine drug screening, and pill counts if necessary. The patient is aware that inappropriate use will results in cessation of prescribing such medications.  RONALD report has been reviewed and scanned into the patient's chart.  History and physical exam exhibit continued safe and appropriate use of controlled substances.

## 2017-09-08 ENCOUNTER — OFFICE VISIT (OUTPATIENT)
Dept: PAIN MEDICINE | Facility: CLINIC | Age: 52
End: 2017-09-08

## 2017-09-08 VITALS
HEART RATE: 96 BPM | TEMPERATURE: 99.3 F | WEIGHT: 243 LBS | SYSTOLIC BLOOD PRESSURE: 160 MMHG | HEIGHT: 73 IN | DIASTOLIC BLOOD PRESSURE: 101 MMHG | OXYGEN SATURATION: 97 % | RESPIRATION RATE: 16 BRPM | BODY MASS INDEX: 32.2 KG/M2

## 2017-09-08 DIAGNOSIS — G60.9 IDIOPATHIC PERIPHERAL NEUROPATHY: ICD-10-CM

## 2017-09-08 PROCEDURE — 99213 OFFICE O/P EST LOW 20 MIN: CPT | Performed by: PAIN MEDICINE

## 2017-09-08 RX ORDER — PREDNISOLONE ACETATE 10 MG/ML
SUSPENSION/ DROPS OPHTHALMIC
Refills: 0 | COMMUNITY
Start: 2017-08-25 | End: 2017-10-05

## 2017-09-08 RX ORDER — HYDROCODONE BITARTRATE AND ACETAMINOPHEN 7.5; 325 MG/1; MG/1
1 TABLET ORAL 2 TIMES DAILY PRN
Qty: 60 TABLET | Refills: 0 | Status: SHIPPED | OUTPATIENT
Start: 2017-09-08 | End: 2017-10-05 | Stop reason: SDUPTHER

## 2017-09-08 RX ORDER — OFLOXACIN 3 MG/ML
SOLUTION/ DROPS OPHTHALMIC
Refills: 1 | COMMUNITY
Start: 2017-08-25 | End: 2018-03-29 | Stop reason: HOSPADM

## 2017-09-08 NOTE — PROGRESS NOTES
CHIEF COMPLAINT: Pain    HPI  Tello Eller is a 52 y.o. male.  He is here to follow up for Pain    Since last visit their pain has worsened. Pt states foot pain is worse at night. He would like prescription to take hydrocodone again because it was helping him. Tried an all natural regimen last month but no significant relief. Stopped norco to try other natural options but states they did not work and best relief has been with norco.     Has completed cymbalta and is now over withdrawal symptoms.     The patient states their pain is a 8 on a scale of 1-10. The patient describes this pain as constant numbness, tingling in bilateral bilateral feet starting at ankle. This painful problem is aggravated by physical activity, work and standing on concrete for too long and is alleviated by relaxation, pain medication and sitting, certain shoes.    Past pain medications: Gabapentin/neurontin 300 mg qid- didn't help with pain, had mental issues   Lyrica (could not afford) - never taking.   Diclofenac/zipsor  Elavil 25 mg - drowsy  Compound cream - no approved  Diclofenac bid - didn't work as well as aleve  Cymbalta 30 mg am x 6 months  Hydrocodone 7.5/325 mg every other day (not much benefit, helps him sleep) - last dose was this am.       Current pain medications:   advil/aleve      Past therapies:  Physical Therapy: no  Chiropractor: yes  Massage Therapy: no  TENS: no  Neck or back surgery: no      Previous Injections: none    PEG Assessment   What number best describes your pain on average in the past week? 6  What number best describes how, during the past week, pain has interfered with your enjoyment of life? 6  What number best describes how, during the past week, pain has interfered with your general activity? 6      Current Outpatient Prescriptions:   •  atorvastatin (LIPITOR) 40 MG tablet, Take 1 tablet by mouth Daily., Disp: 90 tablet, Rfl: 3  •  lisinopril (PRINIVIL,ZESTRIL) 20 MG tablet, Take 1 tablet by mouth  Daily. For blood pressure, Disp: 90 tablet, Rfl: 3  •  Multiple Vitamins-Minerals (MULTIVITAMIN ADULT PO), Take  by mouth., Disp: , Rfl:   •  ofloxacin (OCUFLOX) 0.3 % ophthalmic solution, PLACE 1 DROP THREE TIMES DAILY FOR 2 WEEKS THEN STOP, Disp: , Rfl: 1  •  Omega-3 Fatty Acids (FISH OIL) 1000 MG capsule capsule, Take  by mouth Daily With Breakfast., Disp: , Rfl:   •  prednisoLONE acetate (PRED FORTE) 1 % ophthalmic suspension, 1 DROP INTO RIGHT EYE 3 TIMES A DAY FOR 1 WEEK, THEN TWICE A DAY FOR 1 WEEK THEN DAILY FOR 1 WEEK, Disp: , Rfl: 0  •  vitamin B-12 (CYANOCOBALAMIN) 100 MCG tablet, Take 50 mcg by mouth Daily., Disp: , Rfl:   •  HYDROcodone-acetaminophen (NORCO) 7.5-325 MG per tablet, Take 1 tablet by mouth 2 (Two) Times a Day As Needed for Moderate Pain ., Disp: 60 tablet, Rfl: 0    IMAGING  [facility] for a MRI of Lumbar spine, 2 years ago     PFSH:  The following portions of the patient's history were reviewed and updated as appropriate: problem list, past medical history, past surgery history, social history, family history, medications, and allergies    Review of Systems   Constitutional: Negative for chills, fatigue and fever.   HENT: Negative for congestion.    Eyes: Negative for visual disturbance.   Respiratory: Negative for cough, shortness of breath and wheezing.    Cardiovascular: Negative.  Negative for chest pain.   Gastrointestinal: Negative for constipation, diarrhea, nausea and vomiting.   Genitourinary: Negative for difficulty urinating.   Musculoskeletal: Positive for arthralgias.   Neurological: Positive for dizziness and numbness (feet). Negative for weakness, light-headedness and headaches.   Psychiatric/Behavioral: Negative for confusion, hallucinations, sleep disturbance and suicidal ideas. The patient is nervous/anxious.    All other systems reviewed and are negative.      Vitals:    09/08/17 0926   BP: (!) 160/101   Pulse: 96   Resp: 16   Temp: 99.3 °F (37.4 °C)   SpO2: 97%  "  Weight: 243 lb (110 kg)   Height: 73\" (185.4 cm)   PainSc:   6   PainLoc: Foot       Physical Exam   Constitutional: He is oriented to person, place, and time. Vital signs are normal. He appears well-developed and well-nourished.  Non-toxic appearance. No distress.   HENT:   Head: Normocephalic and atraumatic.   Nose: Nose normal.   Eyes: Conjunctivae and EOM are normal.   Neck: Trachea normal. Neck supple.   Pulmonary/Chest: Effort normal. No respiratory distress.   Musculoskeletal:        Right knee: Tenderness found.        Left knee: Tenderness found.        Right foot: There is tenderness. There is normal range of motion and no deformity.        Left foot: There is tenderness. There is normal range of motion and no deformity.   Neurological: He is alert and oriented to person, place, and time. Gait normal. Gait normal.   dysthesias of BLE's that stops near mid-calf   Skin: Skin is warm and dry. No rash noted. He is not diaphoretic.   Psychiatric: His speech is normal and behavior is normal. His mood appears anxious.   Nursing note and vitals reviewed.    Ortho Exam  Neurologic Exam     Mental Status   Oriented to person, place, and time.   Speech: speech is normal     Cranial Nerves     CN III, IV, VI   Extraocular motions are normal.     Gait, Coordination, and Reflexes     Gait  Gait: normal      Lab Results   Component Value Date    POCMETH Negative 08/11/2017    POCAMPHET Negative 08/11/2017    POCBARBITUR Negative 08/11/2017    POCBENZO Negative 08/11/2017    POCCOCAINE Negative 08/11/2017    POCMETHADO Negative 08/11/2017    POCOPIATES Positive (A) 08/11/2017    POCOXYCODO Negative 08/11/2017    POCPHENCYC Negative 08/11/2017    POCPROPOXY Negative 08/11/2017    POCTHC Negative 08/11/2017    POCTRICYC Negative 08/11/2017     Last UDS results reviewed: 09/08/17   Last UDS: 8/11/2017  Comments: Consistent       Date of last RONALD reviewed : 09/08/17   Comments: Consistent     Assessment/Plan   Tello " was seen today for pain.    Diagnoses and all orders for this visit:    Idiopathic peripheral neuropathy  -     HYDROcodone-acetaminophen (NORCO) 7.5-325 MG per tablet; Take 1 tablet by mouth 2 (Two) Times a Day As Needed for Moderate Pain .    Requested Prescriptions     Signed Prescriptions Disp Refills   • HYDROcodone-acetaminophen (NORCO) 7.5-325 MG per tablet 60 tablet 0     Sig: Take 1 tablet by mouth 2 (Two) Times a Day As Needed for Moderate Pain .     - Continue to stay off cymbalta.   - Will restart norco but at a lower dose of 5/325 mg bid instead of 7.5/325 mg bid.  Given he has been off norco for 1 month will start at lower dose, should be more effective.   - Continue aleve/ibuprofen for joint pain. Discontinue diclofenac while on aleve.   - Side effects of NSAIDS explained as including but not limited to upset stomach, stomach ulcers, bleeding, kidney failure, fluid retention or high blood pressure.  - Labs reviewed. Last CMP was 10/2016 - normal kidney function.   - Discussed with the patient regarding the etiology of their pain. Informed them that they would likely benefit from a bilateral L4 lumbar sympathetic nerve block.  Patient wishes to hold off on any procedures at this time.   - Last UDS performed was reviewed and consistent.  Has two UDS performed was reviewed and INCONSISTENT. No meds but he was out for one week. Will monitor closely. Any more abnormal UDS will result in discharge.   - Consider SCS placement in future for peripheral neuropathy.   - Continue home exercise program.   - Discussed with the patient regarding long-term side effects of opioids including but not limited to opioid induced hormonal suppression, hyperalgesia, possible opioid induced altered immune system, and elevated risk of myocardial infarction.    Wt Readings from Last 3 Encounters:   09/08/17 243 lb (110 kg)   08/11/17 248 lb 6.4 oz (113 kg)   07/14/17 234 lb 12.8 oz (107 kg)     Body mass index is 32.06  kg/(m^2). weight loss of 5 lbs over the past 1 month(s)    Intentional?  Yes Patient counseled on the importance of weight loss to help with overall health and pain control. Patient instructed to attempt weight loss.   Plan: Calorie counting  increase physical activity, reduce portion size, cut out extra servings and reduce fast food intake trying to eat less processed food, more natural ingredients.     Follow-up in 1 month.    Marlin Blanco MD  Pain Management      RONALD REPORT  As part of the patient's treatment plan, I am prescribing controlled substances. The patient has been made aware of appropriate use of such medications, including potential risk of somnolence, limited ability to drive and/or work safely, and the potential for dependence or overdose. It has also bee made clear that these medications are for use by this patient only, without concomitant use of alcohol or other substances unless prescribed.   Patient has completed prescribing agreement detailing terms of continued prescribing of controlled substances, including monitoring RONALD reports, urine drug screening, and pill counts if necessary. The patient is aware that inappropriate use will results in cessation of prescribing such medications.  RONALD report has been reviewed and scanned into the patient's chart.  History and physical exam exhibit continued safe and appropriate use of controlled substances.

## 2017-10-05 ENCOUNTER — OFFICE VISIT (OUTPATIENT)
Dept: PAIN MEDICINE | Facility: CLINIC | Age: 52
End: 2017-10-05

## 2017-10-05 VITALS
WEIGHT: 246 LBS | SYSTOLIC BLOOD PRESSURE: 141 MMHG | BODY MASS INDEX: 32.6 KG/M2 | HEART RATE: 83 BPM | OXYGEN SATURATION: 96 % | RESPIRATION RATE: 18 BRPM | HEIGHT: 73 IN | DIASTOLIC BLOOD PRESSURE: 91 MMHG | TEMPERATURE: 98.3 F

## 2017-10-05 DIAGNOSIS — G89.29 OTHER CHRONIC PAIN: Primary | ICD-10-CM

## 2017-10-05 DIAGNOSIS — G60.9 IDIOPATHIC PERIPHERAL NEUROPATHY: ICD-10-CM

## 2017-10-05 PROCEDURE — 99213 OFFICE O/P EST LOW 20 MIN: CPT | Performed by: PAIN MEDICINE

## 2017-10-05 RX ORDER — HYDROCODONE BITARTRATE AND ACETAMINOPHEN 7.5; 325 MG/1; MG/1
1 TABLET ORAL 2 TIMES DAILY PRN
Qty: 60 TABLET | Refills: 0 | Status: SHIPPED | OUTPATIENT
Start: 2017-10-05 | End: 2017-12-01 | Stop reason: SDUPTHER

## 2017-10-05 NOTE — PROGRESS NOTES
CHIEF COMPLAINT: Foot Pain    HPI  Tello Eller is a 52 y.o. male.  He is here to follow up for Foot Pain  .  Tello Eller is a 52 y.o. male  who presents to the office for follow-up.  Since last visit their pain has remain unchanged.  Feels his pain has been better controlled over the last month with restarting his hydrocodone medication.  The previous month he went without thinking he could do and on natural pain regimen that had increased pain.    The patient states their pain is a 7/8 on a scale of 1-10. The patient describes this pain as constant numbness, tingling in bilateral bilateral feet starting at ankle. This painful problem is aggravated by physical activity, work and standing on concrete for too long and is alleviated by relaxation, pain medication and sitting, certain shoes.  Her last several weeks feels more achiness in his bilateral ankles, worse in the mornings, improved with movement.    Past pain medications: Gabapentin/neurontin 300 mg qid- didn't help with pain, had mental issues   Lyrica (could not afford) - never taking.   Diclofenac/zipsor  Elavil 25 mg - drowsy  Compound cream - no approved  Diclofenac bid - didn't work as well as aleve  Cymbalta 30 mg am x 6 months  Hydrocodone 7.5/325 mg every other day (not much benefit, helps him sleep) - last dose was this am.       Current pain medications:   Advil/aleve back and body  norco bid      Past therapies:  Physical Therapy: no  Chiropractor: yes  Massage Therapy: no  TENS: no  Neck or back surgery: no      Previous Injections: none    PEG Assessment   What number best describes your pain on average in the past week? 8  What number best describes how, during the past week, pain has interfered with your enjoyment of life? 8  What number best describes how, during the past week, pain has interfered with your general activity? 8      Current Outpatient Prescriptions:   •  atorvastatin (LIPITOR) 40 MG tablet, Take 1 tablet by mouth  "Daily., Disp: 90 tablet, Rfl: 3  •  HYDROcodone-acetaminophen (NORCO) 7.5-325 MG per tablet, Take 1 tablet by mouth 2 (Two) Times a Day As Needed for Moderate Pain ., Disp: 60 tablet, Rfl: 0  •  lisinopril (PRINIVIL,ZESTRIL) 20 MG tablet, Take 1 tablet by mouth Daily. For blood pressure, Disp: 90 tablet, Rfl: 3  •  Multiple Vitamins-Minerals (MULTIVITAMIN ADULT PO), Take  by mouth., Disp: , Rfl:   •  ofloxacin (OCUFLOX) 0.3 % ophthalmic solution, PLACE 1 DROP THREE TIMES DAILY FOR 2 WEEKS THEN STOP, Disp: , Rfl: 1  •  Omega-3 Fatty Acids (FISH OIL) 1000 MG capsule capsule, Take  by mouth Daily With Breakfast., Disp: , Rfl:   •  vitamin B-12 (CYANOCOBALAMIN) 100 MCG tablet, Take 50 mcg by mouth Daily., Disp: , Rfl:   •  HYDROcodone-acetaminophen (NORCO) 7.5-325 MG per tablet, Take 1 tablet by mouth 2 (Two) Times a Day As Needed for Moderate Pain ., Disp: 60 tablet, Rfl: 0    IMAGING  [facility] for a MRI of Lumbar spine, 2 years ago     PFSH:  The following portions of the patient's history were reviewed and updated as appropriate: problem list, past medical history, past surgery history, social history, family history, medications, and allergies    Review of Systems   Constitutional: Negative for fatigue.   HENT: Negative for congestion.    Eyes: Negative for visual disturbance.   Respiratory: Negative for cough, shortness of breath and wheezing.    Cardiovascular: Negative.    Gastrointestinal: Negative for constipation and diarrhea.   Genitourinary: Negative for difficulty urinating.   Musculoskeletal: Positive for arthralgias (bilateral ankles and feet).   Neurological: Negative for weakness and numbness.   Psychiatric/Behavioral: Negative for sleep disturbance and suicidal ideas. The patient is not nervous/anxious.        Vitals:    10/05/17 0847   BP: 141/91   Pulse: 83   Resp: 18   Temp: 98.3 °F (36.8 °C)   SpO2: 96%   Weight: 246 lb (112 kg)   Height: 73\" (185.4 cm)   PainSc:   8   PainLoc: Foot       Physical " Exam   Constitutional: He is oriented to person, place, and time. Vital signs are normal. He appears well-developed and well-nourished.  Non-toxic appearance. No distress.   HENT:   Head: Normocephalic and atraumatic.   Nose: Nose normal.   Eyes: Conjunctivae and EOM are normal.   Neck: Trachea normal. Neck supple.   Pulmonary/Chest: Effort normal. No respiratory distress.   Musculoskeletal:        Right knee: Tenderness found.        Left knee: Tenderness found.        Right ankle: He exhibits normal range of motion and no swelling. No tenderness.        Left ankle: He exhibits normal range of motion and no swelling. No tenderness.        Right foot: There is tenderness. There is normal range of motion and no deformity.        Left foot: There is tenderness. There is normal range of motion and no deformity.   Neurological: He is alert and oriented to person, place, and time. Gait normal. Gait normal.   dysthesias of BLE's that stops near mid-calf   Skin: Skin is warm and dry. No rash noted. He is not diaphoretic.   Psychiatric: His speech is normal and behavior is normal. His mood appears anxious.   Nursing note and vitals reviewed.    Ortho Exam  Neurologic Exam     Mental Status   Oriented to person, place, and time.   Speech: speech is normal     Cranial Nerves     CN III, IV, VI   Extraocular motions are normal.     Gait, Coordination, and Reflexes     Gait  Gait: normal      Lab Results   Component Value Date    POCMETH Negative 08/11/2017    POCAMPHET Negative 08/11/2017    POCBARBITUR Negative 08/11/2017    POCBENZO Negative 08/11/2017    POCCOCAINE Negative 08/11/2017    POCMETHADO Negative 08/11/2017    POCOPIATES Positive (A) 08/11/2017    POCOXYCODO Negative 08/11/2017    POCPHENCYC Negative 08/11/2017    POCPROPOXY Negative 08/11/2017    POCTHC Negative 08/11/2017    POCTRICYC Negative 08/11/2017     Last UDS results reviewed: 10/05/17   Last UDS: 8/11/2017  Comments: Consistent       Date of last  RONALD reviewed : 10/05/17   Comments: Consistent     Assessment/Plan   Tello was seen today for foot pain.    Diagnoses and all orders for this visit:    Other chronic pain  -     HYDROcodone-acetaminophen (NORCO) 7.5-325 MG per tablet; Take 1 tablet by mouth 2 (Two) Times a Day As Needed for Moderate Pain .  -     HYDROcodone-acetaminophen (NORCO) 7.5-325 MG per tablet; Take 1 tablet by mouth 2 (Two) Times a Day As Needed for Moderate Pain .    Idiopathic peripheral neuropathy  -     HYDROcodone-acetaminophen (NORCO) 7.5-325 MG per tablet; Take 1 tablet by mouth 2 (Two) Times a Day As Needed for Moderate Pain .  -     HYDROcodone-acetaminophen (NORCO) 7.5-325 MG per tablet; Take 1 tablet by mouth 2 (Two) Times a Day As Needed for Moderate Pain .      Requested Prescriptions     Signed Prescriptions Disp Refills   • HYDROcodone-acetaminophen (NORCO) 7.5-325 MG per tablet 60 tablet 0     Sig: Take 1 tablet by mouth 2 (Two) Times a Day As Needed for Moderate Pain .   • HYDROcodone-acetaminophen (NORCO) 7.5-325 MG per tablet 60 tablet 0     Sig: Take 1 tablet by mouth 2 (Two) Times a Day As Needed for Moderate Pain .     - Patient appears stable with current regimen. No adverse effects. Regarding continuation of opioids, there is no evidence of aberrant behavior or any red flags. The patient continues with appropriate response to opioid therapy. ADL's remain intact by self.    - Continue norco of 7.5/325 mg bid.  (#60, 1 refills)    - Two months prescriptions given today. Appropriate DNF dates applied.    - Continue aleve/ibuprofen for joint pain. Discontinue diclofenac while on aleve.   - Side effects of NSAIDS explained as including but not limited to upset stomach, stomach ulcers, bleeding, kidney failure, fluid retention or high blood pressure.  - Last UDS performed was reviewed and consistent.  Has two UDS performed was reviewed and INCONSISTENT. No meds but he was out for one week. Will monitor closely. Any  more abnormal UDS will result in discharge. Random urine drug screen per office policy AT NEXT VISIT.   - Continue home exercise program.   - Discussed with the patient regarding long-term side effects of opioids including but not limited to opioid induced hormonal suppression, hyperalgesia, possible opioid induced altered immune system, and elevated risk of myocardial infarction.    Wt Readings from Last 3 Encounters:   10/05/17 246 lb (112 kg)   09/08/17 243 lb (110 kg)   08/11/17 248 lb 6.4 oz (113 kg)     Body mass index is 32.46 kg/(m^2). By CDC definitions, this patient is obese (BMI >= 30). Patient counseled on the importance of weight loss to help with overall health and pain control. Patient instructed to attempt weight loss.   Plan: Calorie counting  increase physical activity patient has gained 3 pounds over last month.  Goal of losing 3 pounds over next month.    Follow-up in 2 months.    Marlin Blanco MD  Pain Management      RONALD REPORT  As part of the patient's treatment plan, I am prescribing controlled substances. The patient has been made aware of appropriate use of such medications, including potential risk of somnolence, limited ability to drive and/or work safely, and the potential for dependence or overdose. It has also bee made clear that these medications are for use by this patient only, without concomitant use of alcohol or other substances unless prescribed.   Patient has completed prescribing agreement detailing terms of continued prescribing of controlled substances, including monitoring RONALD reports, urine drug screening, and pill counts if necessary. The patient is aware that inappropriate use will results in cessation of prescribing such medications.  RONALD report has been reviewed and scanned into the patient's chart.  History and physical exam exhibit continued safe and appropriate use of controlled substances.

## 2017-12-01 ENCOUNTER — OFFICE VISIT (OUTPATIENT)
Dept: PAIN MEDICINE | Facility: CLINIC | Age: 52
End: 2017-12-01

## 2017-12-01 VITALS
TEMPERATURE: 98.3 F | DIASTOLIC BLOOD PRESSURE: 73 MMHG | HEIGHT: 72 IN | SYSTOLIC BLOOD PRESSURE: 115 MMHG | WEIGHT: 245 LBS | OXYGEN SATURATION: 95 % | RESPIRATION RATE: 18 BRPM | BODY MASS INDEX: 33.18 KG/M2 | HEART RATE: 75 BPM

## 2017-12-01 DIAGNOSIS — G60.9 IDIOPATHIC PERIPHERAL NEUROPATHY: ICD-10-CM

## 2017-12-01 DIAGNOSIS — G89.29 OTHER CHRONIC PAIN: ICD-10-CM

## 2017-12-01 PROCEDURE — 99213 OFFICE O/P EST LOW 20 MIN: CPT | Performed by: PAIN MEDICINE

## 2017-12-01 RX ORDER — MELOXICAM 15 MG/1
15 TABLET ORAL DAILY
Qty: 30 TABLET | Refills: 5 | Status: SHIPPED | OUTPATIENT
Start: 2017-12-01 | End: 2018-03-29 | Stop reason: HOSPADM

## 2017-12-01 RX ORDER — HYDROCODONE BITARTRATE AND ACETAMINOPHEN 7.5; 325 MG/1; MG/1
1 TABLET ORAL 2 TIMES DAILY PRN
Qty: 60 TABLET | Refills: 0 | Status: SHIPPED | OUTPATIENT
Start: 2017-12-01 | End: 2018-01-30 | Stop reason: SDUPTHER

## 2017-12-01 NOTE — PROGRESS NOTES
CHIEF COMPLAINT: Foot Pain (bilateral)    HPI  Tello Eller is a 52 y.o. male.  He is here to follow up for Foot Pain (bilateral)  .  Tello Eller is a 52 y.o. male  who presents to the office for follow-up. Since last visit their pain has remain unchanged. Stable on current regimen. With no side effects. Wanting to get 2nd medical opinion on worsening pain to see if there is treatment.     The patient states their pain is a 5 on a scale of 1-10.  The patient describes this pain as constant tingling, numbness and shooting.  The pain is located in bilateral foot and does not radiate. This painful problem is aggravated by physical activity and work and is alleviated by pain medication, relaxation and sitting.    Past pain medications: Gabapentin/neurontin 300 mg qid- didn't help with pain, had mental issues   Lyrica (could not afford) - never taking.   Diclofenac/zipsor  Elavil 25 mg - drowsy  Compound cream - no approved  Diclofenac bid - didn't work as well as aleve  Cymbalta 30 mg am x 6 months  Hydrocodone 7.5/325 mg every other day (not much benefit, helps him sleep) - last dose was this am.       Current pain medications:   Advil/aleve back and body  norco bid - help      Past therapies:  Physical Therapy: no  Chiropractor: yes  Massage Therapy: no  TENS: no  Neck or back surgery: no      Previous Injections: none    PEG Assessment   What number best describes your pain on average in the past week? 6  What number best describes how, during the past week, pain has interfered with your enjoyment of life? 6  What number best describes how, during the past week, pain has interfered with your general activity? 6      Current Outpatient Prescriptions:   •  atorvastatin (LIPITOR) 40 MG tablet, Take 1 tablet by mouth Daily., Disp: 90 tablet, Rfl: 3  •  HYDROcodone-acetaminophen (NORCO) 7.5-325 MG per tablet, Take 1 tablet by mouth 2 (Two) Times a Day As Needed for Moderate Pain ., Disp: 60 tablet, Rfl: 0  •   "HYDROcodone-acetaminophen (NORCO) 7.5-325 MG per tablet, Take 1 tablet by mouth 2 (Two) Times a Day As Needed for Moderate Pain ., Disp: 60 tablet, Rfl: 0  •  lisinopril (PRINIVIL,ZESTRIL) 20 MG tablet, Take 1 tablet by mouth Daily. For blood pressure, Disp: 90 tablet, Rfl: 3  •  Multiple Vitamins-Minerals (MULTIVITAMIN ADULT PO), Take  by mouth., Disp: , Rfl:   •  ofloxacin (OCUFLOX) 0.3 % ophthalmic solution, PLACE 1 DROP THREE TIMES DAILY FOR 2 WEEKS THEN STOP, Disp: , Rfl: 1  •  Omega-3 Fatty Acids (FISH OIL) 1000 MG capsule capsule, Take  by mouth Daily With Breakfast., Disp: , Rfl:   •  vitamin B-12 (CYANOCOBALAMIN) 100 MCG tablet, Take 50 mcg by mouth Daily., Disp: , Rfl:   •  meloxicam (MOBIC) 15 MG tablet, Take 1 tablet by mouth Daily., Disp: 30 tablet, Rfl: 5    PFSH:  The following portions of the patient's history were reviewed and updated as appropriate: problem list, past medical history, past surgery history, social history, family history, medications, and allergies    Review of Systems   Constitutional: Negative for chills and fatigue.   Respiratory: Negative for shortness of breath.    Cardiovascular: Negative for chest pain.   Gastrointestinal: Negative for constipation, diarrhea, nausea and vomiting.   Genitourinary: Negative for difficulty urinating, dysuria and enuresis.   Musculoskeletal:        Bilateral foot pain   Neurological: Positive for numbness. Negative for dizziness, weakness, light-headedness and headaches.   Psychiatric/Behavioral: Negative for confusion, hallucinations, self-injury, sleep disturbance and suicidal ideas. The patient is not nervous/anxious.        Vitals:    12/01/17 0804   BP: 115/73   Pulse: 75   Resp: 18   Temp: 98.3 °F (36.8 °C)   SpO2: 95%   Weight: 245 lb (111 kg)   Height: 72\" (182.9 cm)   PainSc:   5   PainLoc: Foot       Physical Exam   Constitutional: He is oriented to person, place, and time. Vital signs are normal. He appears well-developed and " well-nourished.  Non-toxic appearance. No distress.   HENT:   Head: Normocephalic and atraumatic.   Nose: Nose normal.   Eyes: Conjunctivae and EOM are normal.   Neck: Trachea normal. Neck supple.   Pulmonary/Chest: Effort normal. No respiratory distress.   Musculoskeletal:        Right knee: Tenderness found.        Left knee: Tenderness found.        Right ankle: He exhibits normal range of motion and no swelling. No tenderness.        Left ankle: He exhibits normal range of motion and no swelling. No tenderness.        Right foot: There is tenderness. There is normal range of motion and no deformity.        Left foot: There is tenderness. There is normal range of motion and no deformity.   Neurological: He is alert and oriented to person, place, and time. Gait normal.   dysthesias of BLE's that stops near mid-calf   Skin: Skin is warm and dry. No rash noted. He is not diaphoretic.   Psychiatric: His speech is normal and behavior is normal. His mood appears anxious.   Nursing note and vitals reviewed.    Ortho Exam  Neurologic Exam     Mental Status   Oriented to person, place, and time.   Speech: speech is normal     Cranial Nerves     CN III, IV, VI   Extraocular motions are normal.     Sensory Exam   Right leg light touch: decreased from ankle  Left leg light touch: decreased from ankle      Lab Results   Component Value Date    POCMETH Negative 08/11/2017    POCAMPHET Negative 08/11/2017    POCBARBITUR Negative 08/11/2017    POCBENZO Negative 08/11/2017    POCCOCAINE Negative 08/11/2017    POCMETHADO Negative 08/11/2017    POCOPIATES Positive (A) 08/11/2017    POCOXYCODO Negative 08/11/2017    POCPHENCYC Negative 08/11/2017    POCPROPOXY Negative 08/11/2017    POCTHC Negative 08/11/2017    POCTRICYC Negative 08/11/2017     Last UDS results reviewed: 12/01/17   Last UDS: 8/11/2017  Comments: Consistent       Date of last RONALD reviewed : 12/01/17   Comments: Consistent     Assessment/Plan   Tello was seen  today for foot pain.    Diagnoses and all orders for this visit:    Idiopathic peripheral neuropathy  -     Oral Fluid Drug Screen - Saliva, Oral Cavity; Future  -     HYDROcodone-acetaminophen (NORCO) 7.5-325 MG per tablet; Take 1 tablet by mouth 2 (Two) Times a Day As Needed for Moderate Pain .  -     HYDROcodone-acetaminophen (NORCO) 7.5-325 MG per tablet; Take 1 tablet by mouth 2 (Two) Times a Day As Needed for Moderate Pain .  -     meloxicam (MOBIC) 15 MG tablet; Take 1 tablet by mouth Daily.    Other chronic pain  -     HYDROcodone-acetaminophen (NORCO) 7.5-325 MG per tablet; Take 1 tablet by mouth 2 (Two) Times a Day As Needed for Moderate Pain .  -     HYDROcodone-acetaminophen (NORCO) 7.5-325 MG per tablet; Take 1 tablet by mouth 2 (Two) Times a Day As Needed for Moderate Pain .      Requested Prescriptions     Signed Prescriptions Disp Refills   • HYDROcodone-acetaminophen (NORCO) 7.5-325 MG per tablet 60 tablet 0     Sig: Take 1 tablet by mouth 2 (Two) Times a Day As Needed for Moderate Pain .   • HYDROcodone-acetaminophen (NORCO) 7.5-325 MG per tablet 60 tablet 0     Sig: Take 1 tablet by mouth 2 (Two) Times a Day As Needed for Moderate Pain .   • meloxicam (MOBIC) 15 MG tablet 30 tablet 5     Sig: Take 1 tablet by mouth Daily.     - Patient appears stable with current regimen. No adverse effects. Regarding continuation of opioids, there is no evidence of aberrant behavior or any red flags. The patient continues with appropriate response to opioid therapy. ADL's remain intact by self.    - Continue norco of 7.5/325 mg bid.  (#60, 1 refills)    - Two months prescriptions given today. Appropriate DNF dates applied.    - Continue aleve/ibuprofen for joint pain. Wishes to try another prescription for NSAID. Tried diclofenac in past, minimal help will prescribe mobic to see if he can get any relief.   - Side effects of NSAIDS explained as including but not limited to upset stomach, stomach ulcers, bleeding,  kidney failure, fluid retention or high blood pressure.  - Last UDS performed was reviewed and consistent.  Has two UDS performed was reviewed and INCONSISTENT. No meds but he was out for one week. Will monitor closely. Any more abnormal UDS will result in discharge. Random urine drug screen per office policy today, to be checked at next visit.   - Continue home exercise program.   - Discussed with the patient regarding long-term side effects of opioids including but not limited to opioid induced hormonal suppression, hyperalgesia, possible opioid induced altered immune system, and elevated risk of myocardial infarction.    Wt Readings from Last 3 Encounters:   12/01/17 245 lb (111 kg)   10/05/17 246 lb (112 kg)   09/08/17 243 lb (110 kg)     Body mass index is 33.23 kg/(m^2). Patient counseled on the importance of weight loss to help with overall health and pain control. Patient instructed to attempt weight loss.   Plan: Calorie counting  increase physical activity    Follow-up in 2 months.    Marlin Blanco MD  Pain Management    RONALD REPORT  As part of the patient's treatment plan, I am prescribing controlled substances. The patient has been made aware of appropriate use of such medications, including potential risk of somnolence, limited ability to drive and/or work safely, and the potential for dependence or overdose. It has also bee made clear that these medications are for use by this patient only, without concomitant use of alcohol or other substances unless prescribed.   Patient has completed prescribing agreement detailing terms of continued prescribing of controlled substances, including monitoring RONALD reports, urine drug screening, and pill counts if necessary. The patient is aware that inappropriate use will results in cessation of prescribing such medications.  RONALD report has been reviewed and scanned into the patient's chart.  History and physical exam exhibit continued safe and  appropriate use of controlled substances.

## 2018-01-10 RX ORDER — ATORVASTATIN CALCIUM 40 MG/1
TABLET, FILM COATED ORAL
Qty: 90 TABLET | Refills: 3 | Status: SHIPPED | OUTPATIENT
Start: 2018-01-10 | End: 2018-01-25 | Stop reason: SDUPTHER

## 2018-01-22 RX ORDER — LISINOPRIL 20 MG/1
TABLET ORAL
Qty: 90 TABLET | Refills: 3 | OUTPATIENT
Start: 2018-01-22

## 2018-01-25 ENCOUNTER — OFFICE VISIT (OUTPATIENT)
Dept: FAMILY MEDICINE CLINIC | Facility: CLINIC | Age: 53
End: 2018-01-25

## 2018-01-25 VITALS
SYSTOLIC BLOOD PRESSURE: 110 MMHG | RESPIRATION RATE: 16 BRPM | DIASTOLIC BLOOD PRESSURE: 70 MMHG | HEART RATE: 99 BPM | BODY MASS INDEX: 33.66 KG/M2 | HEIGHT: 72 IN | WEIGHT: 248.5 LBS | OXYGEN SATURATION: 94 %

## 2018-01-25 DIAGNOSIS — G60.9 IDIOPATHIC PERIPHERAL NEUROPATHY: ICD-10-CM

## 2018-01-25 DIAGNOSIS — Z12.5 PROSTATE CANCER SCREENING: ICD-10-CM

## 2018-01-25 DIAGNOSIS — Z00.00 HEALTH CARE MAINTENANCE: Primary | ICD-10-CM

## 2018-01-25 DIAGNOSIS — Z12.11 COLON CANCER SCREENING: ICD-10-CM

## 2018-01-25 DIAGNOSIS — R53.82 CHRONIC FATIGUE: ICD-10-CM

## 2018-01-25 DIAGNOSIS — I10 ESSENTIAL HYPERTENSION: ICD-10-CM

## 2018-01-25 DIAGNOSIS — E78.00 PURE HYPERCHOLESTEROLEMIA: ICD-10-CM

## 2018-01-25 PROCEDURE — 99396 PREV VISIT EST AGE 40-64: CPT | Performed by: INTERNAL MEDICINE

## 2018-01-25 RX ORDER — LISINOPRIL 20 MG/1
20 TABLET ORAL DAILY
Qty: 90 TABLET | Refills: 1 | Status: SHIPPED | OUTPATIENT
Start: 2018-01-25 | End: 2018-07-26 | Stop reason: SDUPTHER

## 2018-01-25 RX ORDER — ATORVASTATIN CALCIUM 40 MG/1
40 TABLET, FILM COATED ORAL DAILY
Qty: 90 TABLET | Refills: 1 | Status: SHIPPED | OUTPATIENT
Start: 2018-01-25 | End: 2018-12-06

## 2018-01-26 LAB
ALBUMIN SERPL-MCNC: 4.6 G/DL (ref 3.5–5.2)
ALBUMIN/GLOB SERPL: 1.8 G/DL
ALP SERPL-CCNC: 108 U/L (ref 39–117)
ALT SERPL-CCNC: 31 U/L (ref 1–41)
AST SERPL-CCNC: 20 U/L (ref 1–40)
BASOPHILS # BLD AUTO: 0.04 10*3/MM3 (ref 0–0.2)
BASOPHILS NFR BLD AUTO: 0.7 % (ref 0–1.5)
BILIRUB SERPL-MCNC: 0.7 MG/DL (ref 0.1–1.2)
BUN SERPL-MCNC: 18 MG/DL (ref 6–20)
BUN/CREAT SERPL: 21.2 (ref 7–25)
CALCIUM SERPL-MCNC: 9.6 MG/DL (ref 8.6–10.5)
CHLORIDE SERPL-SCNC: 104 MMOL/L (ref 98–107)
CHOLEST SERPL-MCNC: 212 MG/DL (ref 0–200)
CO2 SERPL-SCNC: 26.6 MMOL/L (ref 22–29)
CREAT SERPL-MCNC: 0.85 MG/DL (ref 0.76–1.27)
EOSINOPHIL # BLD AUTO: 0.22 10*3/MM3 (ref 0–0.7)
EOSINOPHIL NFR BLD AUTO: 4 % (ref 0.3–6.2)
ERYTHROCYTE [DISTWIDTH] IN BLOOD BY AUTOMATED COUNT: 13.8 % (ref 11.5–14.5)
GFR SERPLBLD CREATININE-BSD FMLA CKD-EPI: 114 ML/MIN/1.73
GFR SERPLBLD CREATININE-BSD FMLA CKD-EPI: 94 ML/MIN/1.73
GLOBULIN SER CALC-MCNC: 2.6 GM/DL
GLUCOSE SERPL-MCNC: 112 MG/DL (ref 65–99)
HCT VFR BLD AUTO: 38.7 % (ref 40.4–52.2)
HDLC SERPL-MCNC: 36 MG/DL (ref 40–60)
HGB BLD-MCNC: 12.9 G/DL (ref 13.7–17.6)
IMM GRANULOCYTES # BLD: 0 10*3/MM3 (ref 0–0.03)
IMM GRANULOCYTES NFR BLD: 0 % (ref 0–0.5)
LDLC SERPL CALC-MCNC: 118 MG/DL (ref 0–100)
LYMPHOCYTES # BLD AUTO: 1.35 10*3/MM3 (ref 0.9–4.8)
LYMPHOCYTES NFR BLD AUTO: 24.3 % (ref 19.6–45.3)
MCH RBC QN AUTO: 28.9 PG (ref 27–32.7)
MCHC RBC AUTO-ENTMCNC: 33.3 G/DL (ref 32.6–36.4)
MCV RBC AUTO: 86.6 FL (ref 79.8–96.2)
MONOCYTES # BLD AUTO: 0.49 10*3/MM3 (ref 0.2–1.2)
MONOCYTES NFR BLD AUTO: 8.8 % (ref 5–12)
NEUTROPHILS # BLD AUTO: 3.45 10*3/MM3 (ref 1.9–8.1)
NEUTROPHILS NFR BLD AUTO: 62.2 % (ref 42.7–76)
PLATELET # BLD AUTO: 171 10*3/MM3 (ref 140–500)
POTASSIUM SERPL-SCNC: 4.5 MMOL/L (ref 3.5–5.2)
PROT SERPL-MCNC: 7.2 G/DL (ref 6–8.5)
PSA SERPL-MCNC: 0.63 NG/ML (ref 0–4)
RBC # BLD AUTO: 4.47 10*6/MM3 (ref 4.6–6)
SODIUM SERPL-SCNC: 146 MMOL/L (ref 136–145)
T4 FREE SERPL-MCNC: 0.93 NG/DL (ref 0.93–1.7)
TESTOST SERPL-MCNC: 299 NG/DL (ref 264–916)
TRIGL SERPL-MCNC: 291 MG/DL (ref 0–150)
TSH SERPL DL<=0.005 MIU/L-ACNC: 1.68 MIU/ML (ref 0.27–4.2)
VLDLC SERPL CALC-MCNC: 58.2 MG/DL (ref 5–40)
WBC # BLD AUTO: 5.55 10*3/MM3 (ref 4.5–10.7)

## 2018-01-29 LAB
FERRITIN SERPL-MCNC: 131.3 NG/ML (ref 30–400)
IRON SERPL-MCNC: 69 MCG/DL (ref 59–158)
Lab: NORMAL
VIT B12 SERPL-MCNC: 657 PG/ML (ref 211–946)
WRITTEN AUTHORIZATION: NORMAL

## 2018-01-30 ENCOUNTER — OFFICE VISIT (OUTPATIENT)
Dept: PAIN MEDICINE | Facility: CLINIC | Age: 53
End: 2018-01-30

## 2018-01-30 VITALS
DIASTOLIC BLOOD PRESSURE: 85 MMHG | HEART RATE: 80 BPM | TEMPERATURE: 97.7 F | SYSTOLIC BLOOD PRESSURE: 132 MMHG | RESPIRATION RATE: 16 BRPM | BODY MASS INDEX: 34.24 KG/M2 | HEIGHT: 72 IN | OXYGEN SATURATION: 97 % | WEIGHT: 252.8 LBS

## 2018-01-30 DIAGNOSIS — G60.9 IDIOPATHIC PERIPHERAL NEUROPATHY: Primary | ICD-10-CM

## 2018-01-30 DIAGNOSIS — G89.29 OTHER CHRONIC PAIN: ICD-10-CM

## 2018-01-30 PROCEDURE — 99213 OFFICE O/P EST LOW 20 MIN: CPT | Performed by: PAIN MEDICINE

## 2018-01-30 RX ORDER — HYDROCODONE BITARTRATE AND ACETAMINOPHEN 7.5; 325 MG/1; MG/1
1 TABLET ORAL 2 TIMES DAILY PRN
Qty: 60 TABLET | Refills: 0 | Status: SHIPPED | OUTPATIENT
Start: 2018-01-30 | End: 2018-03-29 | Stop reason: HOSPADM

## 2018-01-30 RX ORDER — HYDROCODONE BITARTRATE AND ACETAMINOPHEN 7.5; 325 MG/1; MG/1
1 TABLET ORAL 2 TIMES DAILY PRN
Qty: 60 TABLET | Refills: 0 | Status: SHIPPED | OUTPATIENT
Start: 2018-01-30 | End: 2018-03-29 | Stop reason: SDUPTHER

## 2018-01-30 NOTE — PROGRESS NOTES
CHIEF COMPLAINT: Pain    HPI  Tello Eller is a 52 y.o. male.  He is here to follow up for Pain    Since last visit their pain has remain unchanged. He states still plans to see a neurologist at [facility] to see what his opinion is.    The patient states their pain is a 6 on a scale of 1-10.  The patient describes this pain as constant burning.  The pain is located in bilateral foot and does not radiate. This painful problem is aggravated by physical activity, standing and walking and is alleviated by pain medication and relaxation.    Past pain medications:   Gabapentin/neurontin 300 mg qid- didn't help with pain, had mental issues   Lyrica (could not afford) - never taking.   Diclofenac/zipsor  Elavil 25 mg - drowsy  Compound cream - no approved  Diclofenac bid - didn't work as well as aleve  Cymbalta 30 mg am x 6 months  Hydrocodone 7.5/325 mg every other day (not much benefit, helps him sleep) - last dose was this am.       Current pain medications:   Advil/aleve back and body  norco bid - help      Past therapies:  Physical Therapy: no  Chiropractor: yes  Massage Therapy: no  TENS: no  Neck or back surgery: no      Previous Injections: none    PEG Assessment   What number best describes your pain on average in the past week? 6  What number best describes how, during the past week, pain has interfered with your enjoyment of life? 6  What number best describes how, during the past week, pain has interfered with your general activity? 6      Current Outpatient Prescriptions:   •  atorvastatin (LIPITOR) 40 MG tablet, Take 1 tablet by mouth Daily., Disp: 90 tablet, Rfl: 1  •  HYDROcodone-acetaminophen (NORCO) 7.5-325 MG per tablet, Take 1 tablet by mouth 2 (Two) Times a Day As Needed for Moderate Pain ., Disp: 60 tablet, Rfl: 0  •  HYDROcodone-acetaminophen (NORCO) 7.5-325 MG per tablet, Take 1 tablet by mouth 2 (Two) Times a Day As Needed for Moderate Pain ., Disp: 60 tablet, Rfl: 0  •  lisinopril  "(PRINIVIL,ZESTRIL) 20 MG tablet, Take 1 tablet by mouth Daily. For blood pressure, Disp: 90 tablet, Rfl: 1  •  meloxicam (MOBIC) 15 MG tablet, Take 1 tablet by mouth Daily., Disp: 30 tablet, Rfl: 5  •  Multiple Vitamins-Minerals (MULTIVITAMIN ADULT PO), Take  by mouth., Disp: , Rfl:   •  ofloxacin (OCUFLOX) 0.3 % ophthalmic solution, PLACE 1 DROP THREE TIMES DAILY FOR 2 WEEKS THEN STOP, Disp: , Rfl: 1  •  Omega-3 Fatty Acids (FISH OIL) 1000 MG capsule capsule, Take  by mouth Daily With Breakfast., Disp: , Rfl:   •  vitamin B-12 (CYANOCOBALAMIN) 100 MCG tablet, Take 50 mcg by mouth Daily., Disp: , Rfl:     PFSH:  The following portions of the patient's history were reviewed and updated as appropriate: problem list, past medical history, past surgery history, social history, family history, medications, and allergies    Review of Systems   Constitutional: Negative for chills and fatigue.   Respiratory: Negative for cough, shortness of breath and wheezing.    Cardiovascular: Negative for chest pain.   Gastrointestinal: Negative for constipation, diarrhea, nausea and vomiting.   Genitourinary: Negative for difficulty urinating, dysuria and enuresis.   Musculoskeletal:        Bilateral foot pain   Neurological: Positive for numbness. Negative for dizziness, weakness, light-headedness and headaches.   Psychiatric/Behavioral: Negative for agitation, confusion, hallucinations, self-injury, sleep disturbance and suicidal ideas. The patient is not nervous/anxious.    All other systems reviewed and are negative.      Vitals:    01/30/18 0741   BP: 132/85   Pulse: 80   Resp: 16   Temp: 97.7 °F (36.5 °C)   SpO2: 97%   Weight: 115 kg (252 lb 12.8 oz)   Height: 182.9 cm (72.01\")   PainSc:   6   PainLoc: Foot       Physical Exam   Constitutional: He is oriented to person, place, and time. Vital signs are normal. He appears well-developed and well-nourished.  Non-toxic appearance. No distress.   HENT:   Head: Normocephalic and " atraumatic.   Nose: Nose normal.   Eyes: Conjunctivae and EOM are normal.   Neck: Trachea normal. Neck supple.   Pulmonary/Chest: Effort normal. No respiratory distress.   Musculoskeletal:        Right knee: No tenderness found.        Left knee: No tenderness found.        Right ankle: He exhibits normal range of motion and no swelling. No tenderness.        Left ankle: He exhibits normal range of motion and no swelling. No tenderness.        Right foot: There is tenderness. There is normal range of motion and no deformity.        Left foot: There is tenderness. There is normal range of motion and no deformity.   Neurological: He is alert and oriented to person, place, and time. Gait normal.   dysthesias of BLE's that stops near mid-calf   Skin: Skin is warm and dry. No rash noted. He is not diaphoretic.   Psychiatric: His speech is normal and behavior is normal. His mood appears anxious.   Nursing note and vitals reviewed.    Ortho Exam  Neurologic Exam     Mental Status   Oriented to person, place, and time.   Speech: speech is normal     Cranial Nerves     CN III, IV, VI   Extraocular motions are normal.       Lab Results   Component Value Date    POCMETH Negative 08/11/2017    POCAMPHET Negative 08/11/2017    POCBARBITUR Negative 08/11/2017    POCBENZO Negative 08/11/2017    POCCOCAINE Negative 08/11/2017    POCMETHADO Negative 08/11/2017    POCOPIATES Positive (A) 08/11/2017    POCOXYCODO Negative 08/11/2017    POCPHENCYC Negative 08/11/2017    POCPROPOXY Negative 08/11/2017    POCTHC Negative 08/11/2017    POCTRICYC Negative 08/11/2017     Last UDS results reviewed: 01/30/18   Last UDS: 12/1/2017  Comments: Consistent       Date of last RONALD reviewed : 01/30/18   Comments: Consistent     Assessment/Plan   Tello was seen today for pain.    Diagnoses and all orders for this visit:    Idiopathic peripheral neuropathy  -     HYDROcodone-acetaminophen (NORCO) 7.5-325 MG per tablet; Take 1 tablet by mouth 2  (Two) Times a Day As Needed for Moderate Pain .  -     HYDROcodone-acetaminophen (NORCO) 7.5-325 MG per tablet; Take 1 tablet by mouth 2 (Two) Times a Day As Needed for Moderate Pain .    Other chronic pain  -     HYDROcodone-acetaminophen (NORCO) 7.5-325 MG per tablet; Take 1 tablet by mouth 2 (Two) Times a Day As Needed for Moderate Pain .  -     HYDROcodone-acetaminophen (NORCO) 7.5-325 MG per tablet; Take 1 tablet by mouth 2 (Two) Times a Day As Needed for Moderate Pain .      Requested Prescriptions     Signed Prescriptions Disp Refills   • HYDROcodone-acetaminophen (NORCO) 7.5-325 MG per tablet 60 tablet 0     Sig: Take 1 tablet by mouth 2 (Two) Times a Day As Needed for Moderate Pain .   • HYDROcodone-acetaminophen (NORCO) 7.5-325 MG per tablet 60 tablet 0     Sig: Take 1 tablet by mouth 2 (Two) Times a Day As Needed for Moderate Pain .     - Patient appears stable with current regimen. No adverse effects. Regarding continuation of opioids, there is no evidence of aberrant behavior or any red flags. The patient continues with appropriate response to opioid therapy. ADL's remain intact by self.    - Continue norco of 7.5/325 mg bid.  (#60, 1 refills)    - Two months prescriptions given today. Appropriate DNF dates applied.    - Continue aleve/ibuprofen for joint pain. Wishes to try another prescription for NSAID. Tried diclofenac in past, minimal help will prescribe mobic to see if he can get any relief.   - Side effects of NSAIDS explained as including but not limited to upset stomach, stomach ulcers, bleeding, kidney failure, fluid retention or high blood pressure.  - Last UDS performed was reviewed and consistent.     - Continue home exercise program.   - Discussed with the patient regarding long-term side effects of opioids including but not limited to opioid induced hormonal suppression, hyperalgesia, possible opioid induced altered immune system, and elevated risk of myocardial infarction.    Wt Readings  from Last 3 Encounters:   01/30/18 115 kg (252 lb 12.8 oz)   01/25/18 113 kg (248 lb 8 oz)   12/01/17 111 kg (245 lb)     Body mass index is 34.28 kg/(m^2). Patient counseled on the importance of weight loss to help with overall health and pain control. Patient instructed to attempt weight loss.   Plan: Calorie counting  increase water intake and increase physical activity    Follow-up in 2 months.    Marlin Blanco MD  Pain Management    RONALD REPORT  As part of the patient's treatment plan, I am prescribing controlled substances. The patient has been made aware of appropriate use of such medications, including potential risk of somnolence, limited ability to drive and/or work safely, and the potential for dependence or overdose. It has also bee made clear that these medications are for use by this patient only, without concomitant use of alcohol or other substances unless prescribed.   Patient has completed prescribing agreement detailing terms of continued prescribing of controlled substances, including monitoring RONALD reports, urine drug screening, and pill counts if necessary. The patient is aware that inappropriate use will results in cessation of prescribing such medications.  RONALD report has been reviewed and scanned into the patient's chart.  History and physical exam exhibit continued safe and appropriate use of controlled substances.

## 2018-02-07 ENCOUNTER — TELEPHONE (OUTPATIENT)
Dept: FAMILY MEDICINE CLINIC | Facility: CLINIC | Age: 53
End: 2018-02-07

## 2018-03-29 ENCOUNTER — OFFICE VISIT (OUTPATIENT)
Dept: PAIN MEDICINE | Facility: CLINIC | Age: 53
End: 2018-03-29

## 2018-03-29 VITALS
HEIGHT: 72 IN | DIASTOLIC BLOOD PRESSURE: 83 MMHG | RESPIRATION RATE: 18 BRPM | WEIGHT: 248 LBS | HEART RATE: 82 BPM | SYSTOLIC BLOOD PRESSURE: 123 MMHG | TEMPERATURE: 97.3 F | BODY MASS INDEX: 33.59 KG/M2 | OXYGEN SATURATION: 94 %

## 2018-03-29 DIAGNOSIS — G89.29 OTHER CHRONIC PAIN: Primary | ICD-10-CM

## 2018-03-29 DIAGNOSIS — G60.9 IDIOPATHIC PERIPHERAL NEUROPATHY: ICD-10-CM

## 2018-03-29 DIAGNOSIS — M79.606 PAIN OF LOWER EXTREMITY, UNSPECIFIED LATERALITY: ICD-10-CM

## 2018-03-29 PROCEDURE — 99214 OFFICE O/P EST MOD 30 MIN: CPT | Performed by: NURSE PRACTITIONER

## 2018-03-29 RX ORDER — HYDROCODONE BITARTRATE AND ACETAMINOPHEN 7.5; 325 MG/1; MG/1
1 TABLET ORAL 2 TIMES DAILY PRN
Qty: 60 TABLET | Refills: 0 | Status: SHIPPED | OUTPATIENT
Start: 2018-03-29 | End: 2018-04-24 | Stop reason: SDUPTHER

## 2018-03-29 NOTE — PROGRESS NOTES
"CHIEF COMPLAINT  Bilateral foot pain is unchanged since last office visit.    Subjective   Tello Eller is a 52 y.o. male  who presents to the office for follow-up.He has a history of foot pain.    He complains of pain of bilateral LE's.  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  He takes Hydrocodone 7.5/325 takes twice daily.  Denies side effects.  Reports moderate pain reduction with this regimen. He also takes Ibuprofen/Aleve \"I take by the handful\".      Has been to neurology (Dr. Dillon).  Has had NC test, MRI's, and thorough work up.  Has tried multiple medications without benefit, had side effects with many (see list below).  He wants to get a second opinion.      Has been given information on SCS therapy previously but does have some questions.      Leg Pain    Incident onset: chronic- peripheral neuropathy. There was no injury mechanism. The pain is present in the left foot and right foot. The quality of the pain is described as burning. The pain is at a severity of 3/10. The pain is moderate. The pain has been worsening since onset. Associated symptoms include numbness. Treatments tried: medication. The treatment provided moderate relief.      Past pain medications:   Gabapentin/neurontin 300 mg qid- didn't help with pain, had mental issues   Lyrica (could not afford) - never took  Diclofenac/zipsor  Elavil 25 mg - drowsy  Compound cream - no approved  Diclofenac bid - didn't work as well as aleve  Cymbalta 30 mg am x 6 months  Hydrocodone 7.5/325 mg every other day (not much benefit, helps him sleep) - last dose was this am.       Current pain medications:   Advil/aleve back and body  norco bid - help      Past therapies:  Physical Therapy: no  Chiropractor: yes  Massage Therapy: no  TENS: no  Neck or back surgery: no    PEG Assessment   What number best describes your pain on average in the past week?6  What number best " "describes how, during the past week, pain has interfered with your enjoyment of life?6  What number best describes how, during the past week, pain has interfered with your general activity?  6    The following portions of the patient's history were reviewed and updated as appropriate: allergies, current medications, past family history, past medical history, past social history, past surgical history and problem list.    Review of Systems   Constitutional: Negative for chills and fever.   Respiratory: Negative for shortness of breath.    Cardiovascular: Negative for chest pain.   Gastrointestinal: Negative for constipation, diarrhea, nausea and vomiting.   Genitourinary: Negative for difficulty urinating, dysuria and enuresis.   Musculoskeletal:        Foot pain   Neurological: Positive for weakness and numbness. Negative for dizziness, light-headedness and headaches.   Psychiatric/Behavioral: Negative for confusion, hallucinations, self-injury, sleep disturbance and suicidal ideas. The patient is not nervous/anxious.        Vitals:    03/29/18 0817   BP: 123/83   Pulse: 82   Resp: 18   Temp: 97.3 °F (36.3 °C)   SpO2: 94%   Weight: 112 kg (248 lb)   Height: 182.9 cm (72\")   PainSc:   3   PainLoc: Foot     Objective   Physical Exam   Constitutional: He is oriented to person, place, and time. Vital signs are normal. He appears well-developed and well-nourished.  Non-toxic appearance. No distress.   HENT:   Head: Normocephalic and atraumatic.   Nose: Nose normal.   Eyes: Conjunctivae and EOM are normal.   Neck: Trachea normal. Neck supple.   Cardiovascular: Normal rate.    Pulmonary/Chest: Effort normal. No respiratory distress.   Musculoskeletal:        Right foot: There is tenderness. There is normal range of motion.        Left foot: There is tenderness. There is normal range of motion.   Neurological: He is alert and oriented to person, place, and time. Gait normal.   Reflex Scores:       Patellar reflexes are 2+ " on the right side and 2+ on the left side.  dysthesias of BLE's    Skin: Skin is warm and dry. No rash noted. He is not diaphoretic.   Psychiatric: He has a normal mood and affect. His speech is normal and behavior is normal.   Nursing note and vitals reviewed.      Assessment/Plan   Tello was seen today for foot pain.    Diagnoses and all orders for this visit:    Other chronic pain  -     HYDROcodone-acetaminophen (NORCO) 7.5-325 MG per tablet; Take 1 tablet by mouth 2 (Two) Times a Day As Needed for Moderate Pain .    Idiopathic peripheral neuropathy  -     HYDROcodone-acetaminophen (NORCO) 7.5-325 MG per tablet; Take 1 tablet by mouth 2 (Two) Times a Day As Needed for Moderate Pain .    Pain of lower extremity, unspecified laterality      --- Refill Hydrocodone. Patient appears stable with current regimen. No adverse effects. Regarding continuation of opioids, there is no evidence of aberrant behavior or any red flags.  The patient continues with appropriate response to opioid therapy. ADL's remain intact by self.   --- The ODT confirmation from 12/1/2017 has been reviewed and the result is appropriate based on patient history and RONALD report  --- We discussed not exceeding the maximum daily dose of ibuprofen/Aleve   --- Consider SCS trial   --- Follow-up 2 months       ----------  Education about SCS therapy:    -  This was an extended office visit in which we entered into discussion about advanced pain relieving techniques, and discussed implantable pain therapies.  We discussed advanced neuromodulation in the form of Spinal Cord Stimulation.  This is a reasonable therapy for patients who have exhausted basic nonnarcotic options, basic modalities and physical therapies, and do not have any other reasonable surgical options.  This therapy as an alternative to long term high dose opioid therapy.    -  Risks include but are not limited to bleeding, infection, injury, paralysis, nerve injury, dural puncture,  and risk for postprocedural pain.  Implanted equipment risks include but are not limited to lead migration, lead fracture, risk of loss of pain relieving stimulation, risk of electrical shock, and risk of system failure.    - We discussed the theory and basic science behind SCS therapy including but not limited to energy delivery and relevant anatomy, in terms that are easy to understand and also with use of illustrative devices.  Spinal Cord Stimulation therapies apply an electromagnetic field to a specific area on the spinal cord (Dorsal Column) to attempt to block transmission of painful signals from the peripheral nerves to the brain.    -  We discussed that prior to trialing, I request that patients review relevant materials and perform some research, and also have a follow up education session with a device specialist from the .  Also, insurance requires a presurgical psychological evaluation.  When these have been completed, and all the patient's questions have been answered to their satisfaction, then we will plan to request authorization for trialing.   - We discussed the trialing process (aka Phase 1)  that usually lasts a week, and the temporary nature of this trial.  Trial success will determine whether or not we proceed to implant.  We discussed reasonable expectations, and that I feel that consistent 50% pain relief is medically successful and is a reasonable expectation to justify moving forward to permanent implant.    -  Additional risks of Phase 1 include but are not limited to bleeding on insertion, bleeding on lead removal, and procedural site soreness.  - We discussed the percutaneous surgical implant, including postsurgical restrictions, risks, and alternatives.   For spinal cord stimulation implanted device (aka SCS Phase 2) there is usually a midline vertical incision for the spinally implanted leads, and also a horizontal incision in the posterior lumbar flank for implantation of  the battery & computer (aka IPG).  The leads are tunneled from the midline incision to the medial aspect of the battery pocket incision.    -  Postoperative restrictions include limiting the following activity as much as possible for 90 days:  Lifting >10 lbs, bending at the waist, stretching/reaching overhead, and twisting.  ----------         RONALD REPORT    As part of the patient's treatment plan, I am prescribing controlled substances. The patient has been made aware of appropriate use of such medications, including potential risk of somnolence, limited ability to drive and/or work safely, and the potential for dependence or overdose. It has also bee made clear that these medications are for use by this patient only, without concomitant use of alcohol or other substances unless prescribed.     Patient has completed prescribing agreement detailing terms of continued prescribing of controlled substances, including monitoring RONALD reports, urine drug screening, and pill counts if necessary. The patient is aware that inappropriate use will results in cessation of prescribing such medications.    RONALD report has been reviewed and scanned into the patient's chart.    Date of last RONALD : 3/28/2018    History and physical exam exhibit continued safe and appropriate use of controlled substances.    EMR Dragon/Transcription disclaimer:   Much of this encounter note is an electronic transcription/translation of spoken language to printed text. The electronic translation of spoken language may permit erroneous, or at times, nonsensical words or phrases to be inadvertently transcribed; Although I have reviewed the note for such errors, some may still exist.      Initial Visit with STEPHEN Patel

## 2018-04-10 ENCOUNTER — CLINICAL SUPPORT (OUTPATIENT)
Dept: PAIN MEDICINE | Facility: CLINIC | Age: 53
End: 2018-04-10

## 2018-04-10 DIAGNOSIS — G89.29 OTHER CHRONIC PAIN: Primary | ICD-10-CM

## 2018-04-10 PROCEDURE — 80305 DRUG TEST PRSMV DIR OPT OBS: CPT | Performed by: NURSE PRACTITIONER

## 2018-04-10 RX ORDER — MELOXICAM 15 MG/1
15 TABLET ORAL DAILY
Qty: 90 TABLET | Refills: 5 | Status: SHIPPED | OUTPATIENT
Start: 2018-04-10 | End: 2018-08-06

## 2018-04-10 NOTE — PROGRESS NOTES
Random UDS and pill count:    Leonardo Eller  [facility] [phone]  Date filled : 4-4-18  Hydrocodone-acetamin 7.5-325 mg  Take 1 tablet by mouth twice a day as needed moderate pain  QTY: 60  Pill count: 46    UDS: OPI+ on preliminary

## 2018-04-15 ENCOUNTER — RESULTS ENCOUNTER (OUTPATIENT)
Dept: PAIN MEDICINE | Facility: CLINIC | Age: 53
End: 2018-04-15

## 2018-04-15 DIAGNOSIS — G89.29 OTHER CHRONIC PAIN: ICD-10-CM

## 2018-04-24 DIAGNOSIS — G60.9 IDIOPATHIC PERIPHERAL NEUROPATHY: ICD-10-CM

## 2018-04-24 DIAGNOSIS — G89.29 OTHER CHRONIC PAIN: ICD-10-CM

## 2018-04-24 RX ORDER — HYDROCODONE BITARTRATE AND ACETAMINOPHEN 7.5; 325 MG/1; MG/1
1 TABLET ORAL 2 TIMES DAILY PRN
Qty: 60 TABLET | Refills: 0 | Status: SHIPPED | OUTPATIENT
Start: 2018-04-24 | End: 2018-06-05 | Stop reason: SDUPTHER

## 2018-04-24 NOTE — TELEPHONE ENCOUNTER
Medication Refill Request    Date of phone call: 4-24-18    Medication being requested: Hydrocodone-apap 7.5-325 mg sig: Take 1 tablet by mouth 2 times a day as needed for moderate pain  Qty: 60 tablets    Date of last visit: 3-29-18    Date of last refill: 3-29-18    RONALD up to date?: 3-28-18    Next Follow up?: 5-29-18    Any new pertinent information? (i.e, new medication allergies, new use of medications, change in patient's health or condition, non-compliance or inconsistency with prescribing agreement?): n/a

## 2018-06-05 ENCOUNTER — OFFICE VISIT (OUTPATIENT)
Dept: PAIN MEDICINE | Facility: CLINIC | Age: 53
End: 2018-06-05

## 2018-06-05 VITALS
RESPIRATION RATE: 18 BRPM | OXYGEN SATURATION: 94 % | HEART RATE: 73 BPM | SYSTOLIC BLOOD PRESSURE: 134 MMHG | TEMPERATURE: 98.3 F | DIASTOLIC BLOOD PRESSURE: 82 MMHG | BODY MASS INDEX: 33.35 KG/M2 | WEIGHT: 246.2 LBS | HEIGHT: 72 IN

## 2018-06-05 DIAGNOSIS — G60.9 IDIOPATHIC PERIPHERAL NEUROPATHY: Primary | ICD-10-CM

## 2018-06-05 DIAGNOSIS — G89.29 OTHER CHRONIC PAIN: ICD-10-CM

## 2018-06-05 PROCEDURE — 99214 OFFICE O/P EST MOD 30 MIN: CPT | Performed by: PAIN MEDICINE

## 2018-06-05 RX ORDER — HYDROCODONE BITARTRATE AND ACETAMINOPHEN 7.5; 325 MG/1; MG/1
1 TABLET ORAL 2 TIMES DAILY PRN
Qty: 60 TABLET | Refills: 0 | Status: SHIPPED | OUTPATIENT
Start: 2018-06-05 | End: 2018-07-10 | Stop reason: SDUPTHER

## 2018-06-05 NOTE — PROGRESS NOTES
CHIEF COMPLAINT: Foot Pain    HPI  Tello Eller is a 52 y.o. male.  He is here to follow up for Foot Pain    Tello Eller is a 52 y.o. male  who presents to the office for follow-up.  Since last visit their pain has worsened. No change in activity. Medication still helping. No side effects. Burning is constant and fluctuates.   The patient states their pain is a 6 on a scale of 1-10.  The patient describes this pain as constant burning.  The pain is located in bilateral foot and does not radiate. This painful problem is aggravated by physical activity and standing and is alleviated by pain medication and relaxation.    Had tried CBD oil in past without success.     Past pain medications:   Gabapentin/neurontin 300 mg qid- didn't help with pain, had mental issues   Lyrica (could not afford) - never taking.   Diclofenac/zipsor  Elavil 25 mg - drowsy  Compound cream - no approved  Diclofenac bid - didn't work as well as aleve  Cymbalta 30 mg am x 6 months  Hydrocodone 7.5/325 mg every other day (not much benefit, helps him sleep) - last dose was this am.       Current pain medications:   Advil/aleve back and body  norco bid - help      Past therapies:  Physical Therapy: no  Chiropractor: yes  Massage Therapy: no  TENS: no  Neck or back surgery: no      Previous Injections: none    PEG Assessment   What number best describes your pain on average in the past week? 7  What number best describes how, during the past week, pain has interfered with your enjoyment of life? 7  What number best describes how, during the past week, pain has interfered with your general activity? 7      Current Outpatient Prescriptions:   •  atorvastatin (LIPITOR) 40 MG tablet, Take 1 tablet by mouth Daily., Disp: 90 tablet, Rfl: 1  •  HYDROcodone-acetaminophen (NORCO) 7.5-325 MG per tablet, Take 1 tablet by mouth 2 (Two) Times a Day As Needed for Moderate Pain ., Disp: 60 tablet, Rfl: 0  •  lisinopril (PRINIVIL,ZESTRIL) 20 MG tablet,  "Take 1 tablet by mouth Daily. For blood pressure, Disp: 90 tablet, Rfl: 1  •  meloxicam (MOBIC) 15 MG tablet, Take 1 tablet by mouth Daily., Disp: 90 tablet, Rfl: 5    PFSH:  The following portions of the patient's history were reviewed and updated as appropriate: problem list, past medical history, past surgery history, social history, family history, medications, and allergies    Review of Systems   Constitutional: Negative for fatigue.   HENT: Negative for congestion.    Eyes: Negative for visual disturbance.   Respiratory: Negative for cough, shortness of breath and wheezing.    Cardiovascular: Negative.    Gastrointestinal: Negative for constipation and diarrhea.   Genitourinary: Negative for difficulty urinating.   Musculoskeletal: Positive for arthralgias (bilateral feet).   Neurological: Positive for weakness (bilateral feet) and numbness (bilateral feet).   Psychiatric/Behavioral: Negative for sleep disturbance and suicidal ideas. The patient is not nervous/anxious.    All other systems reviewed and are negative.      Vitals:    06/05/18 0909   BP: 134/82   Pulse: 73   Resp: 18   Temp: 98.3 °F (36.8 °C)   SpO2: 94%   Weight: 112 kg (246 lb 3.2 oz)   Height: 182.9 cm (72\")   PainSc:   6   PainLoc: Foot       Physical Exam   Constitutional: He is oriented to person, place, and time. Vital signs are normal. He appears well-developed and well-nourished.  Non-toxic appearance. No distress.   HENT:   Head: Normocephalic and atraumatic.   Nose: Nose normal.   Eyes: Conjunctivae and EOM are normal.   Neck: Trachea normal. Neck supple.   Cardiovascular: Normal rate.    Pulmonary/Chest: Effort normal. No respiratory distress.   Musculoskeletal:        Right foot: There is tenderness. There is normal range of motion.        Left foot: There is tenderness. There is normal range of motion.   Neurological: He is alert and oriented to person, place, and time. Gait normal.   dysthesias of BLE's    Skin: Skin is warm and " dry. No rash noted. He is not diaphoretic.   Psychiatric: He has a normal mood and affect. His speech is normal and behavior is normal.   Nursing note and vitals reviewed.    Ortho Exam  Neurologic Exam     Mental Status   Oriented to person, place, and time.   Speech: speech is normal     Cranial Nerves     CN III, IV, VI   Extraocular motions are normal.       Lab Results   Component Value Date    POCMETH Negative 04/10/2018    POCAMPHET Negative 04/10/2018    POCBARBITUR Negative 04/10/2018    POCBENZO Negative 04/10/2018    POCCOCAINE Negative 04/10/2018    POCMETHADO Negative 04/10/2018    POCOPIATES Positive (A) 04/10/2018    POCOXYCODO Negative 04/10/2018    POCPHENCYC Negative 04/10/2018    POCPROPOXY Negative 04/10/2018    POCTHC Negative 04/10/2018    POCTRICYC Negative 04/10/2018     Last UDS results reviewed: 06/05/18   Last UDS: 4/2018  Comments: Consistent     Date of last RONALD reviewed : 06/05/18   Comments: Axel         Tello was seen today for foot pain.    Diagnoses and all orders for this visit:    Idiopathic peripheral neuropathy  -     HYDROcodone-acetaminophen (NORCO) 7.5-325 MG per tablet; Take 1 tablet by mouth 2 (Two) Times a Day As Needed for Moderate Pain .    Other chronic pain  -     HYDROcodone-acetaminophen (NORCO) 7.5-325 MG per tablet; Take 1 tablet by mouth 2 (Two) Times a Day As Needed for Moderate Pain .      Requested Prescriptions     Signed Prescriptions Disp Refills   • HYDROcodone-acetaminophen (NORCO) 7.5-325 MG per tablet 60 tablet 0     Sig: Take 1 tablet by mouth 2 (Two) Times a Day As Needed for Moderate Pain .     - Patient appears stable with current regimen. No adverse effects. Regarding continuation of opioids, there is no evidence of aberrant behavior or any red flags. The patient continues with appropriate response to opioid therapy. ADL's remain intact by self.    - Continue norco of 7.5/325 mg bid.   - Continue aleve/ibuprofen for joint pain. Not to go  over max dose.  - Side effects of NSAIDS explained as including but not limited to upset stomach, stomach ulcers, bleeding, kidney failure, fluid retention or high blood pressure.  - Last UDS performed was reviewed and consistent.     - Continue home exercise program.   - Discussed with the patient regarding long-term side effects of opioids including but not limited to opioid induced hormonal suppression, hyperalgesia, possible opioid induced altered immune system, and elevated risk of myocardial infarction.  - will check on foot injection and discuss at next visit.   - is considering [facility] system and 2nd opinion.    - This was a 25 minute face to face visit with 15 minutes spent counseling on medication, usage and treatment plan.      Wt Readings from Last 3 Encounters:   06/05/18 112 kg (246 lb 3.2 oz)   03/29/18 112 kg (248 lb)   01/30/18 115 kg (252 lb 12.8 oz)     Body mass index is 33.39 kg/m². Patient counseled on the importance of weight loss to help with overall health and pain control. Patient instructed to attempt weight loss.   Plan: Calorie counting  increase physical activity, cut out extra servings and reduce fast food intake    Follow-up in 2 months.    Marlin Blanco MD  Pain Management     RONALD REPORT  As part of the patient's treatment plan, I am prescribing controlled substances. The patient has been made aware of appropriate use of such medications, including potential risk of somnolence, limited ability to drive and/or work safely, and the potential for dependence or overdose. It has also bee made clear that these medications are for use by this patient only, without concomitant use of alcohol or other substances unless prescribed.   Patient has completed prescribing agreement detailing terms of continued prescribing of controlled substances, including monitoring RONALD reports, urine drug screening, and pill counts if necessary. The patient is aware that inappropriate use will results  in cessation of prescribing such medications.  RONALD report has been reviewed and scanned into the patient's chart.  History and physical exam exhibit continued safe and appropriate use of controlled substances.

## 2018-07-10 DIAGNOSIS — G60.9 IDIOPATHIC PERIPHERAL NEUROPATHY: ICD-10-CM

## 2018-07-10 DIAGNOSIS — G89.29 OTHER CHRONIC PAIN: ICD-10-CM

## 2018-07-10 RX ORDER — HYDROCODONE BITARTRATE AND ACETAMINOPHEN 7.5; 325 MG/1; MG/1
1 TABLET ORAL 2 TIMES DAILY PRN
Qty: 60 TABLET | Refills: 0 | Status: SHIPPED | OUTPATIENT
Start: 2018-07-10 | End: 2018-08-06 | Stop reason: SDUPTHER

## 2018-07-10 NOTE — TELEPHONE ENCOUNTER
Medication Refill Request    Date of phone call: 7-09-18    Medication being requested: Hydrocodone-apap 7.5-325 mg sig: Take 1 tablet by mouth 2 times a day as needed   Qty: 60 tablets    Date of last visit: 6-05-18    Date of last refill: 6-05-18    RONALD up to date?: 6-04-18    Next Follow up?: not scheduled    Any new pertinent information? (i.e, new medication allergies, new use of medications, change in patient's health or condition, non-compliance or inconsistency with prescribing agreement?): n/a

## 2018-07-26 RX ORDER — LISINOPRIL 20 MG/1
20 TABLET ORAL DAILY
Qty: 90 TABLET | Refills: 0 | Status: SHIPPED | OUTPATIENT
Start: 2018-07-26 | End: 2018-12-06

## 2018-08-06 ENCOUNTER — OFFICE VISIT (OUTPATIENT)
Dept: PAIN MEDICINE | Facility: CLINIC | Age: 53
End: 2018-08-06

## 2018-08-06 VITALS
HEART RATE: 89 BPM | BODY MASS INDEX: 33.18 KG/M2 | OXYGEN SATURATION: 94 % | DIASTOLIC BLOOD PRESSURE: 79 MMHG | WEIGHT: 245 LBS | HEIGHT: 72 IN | TEMPERATURE: 98 F | SYSTOLIC BLOOD PRESSURE: 130 MMHG | RESPIRATION RATE: 16 BRPM

## 2018-08-06 DIAGNOSIS — G60.9 IDIOPATHIC PERIPHERAL NEUROPATHY: Primary | ICD-10-CM

## 2018-08-06 DIAGNOSIS — G89.4 CHRONIC PAIN SYNDROME: ICD-10-CM

## 2018-08-06 DIAGNOSIS — M79.604 PAIN IN BOTH LOWER EXTREMITIES: ICD-10-CM

## 2018-08-06 DIAGNOSIS — G89.29 OTHER CHRONIC PAIN: ICD-10-CM

## 2018-08-06 DIAGNOSIS — M79.605 PAIN IN BOTH LOWER EXTREMITIES: ICD-10-CM

## 2018-08-06 PROCEDURE — 99213 OFFICE O/P EST LOW 20 MIN: CPT | Performed by: PAIN MEDICINE

## 2018-08-06 RX ORDER — NAPROXEN SODIUM 220 MG
440 TABLET ORAL 2 TIMES DAILY PRN
COMMUNITY

## 2018-08-06 RX ORDER — HYDROCODONE BITARTRATE AND ACETAMINOPHEN 7.5; 325 MG/1; MG/1
1 TABLET ORAL 2 TIMES DAILY PRN
Qty: 60 TABLET | Refills: 0 | Status: SHIPPED | OUTPATIENT
Start: 2018-08-06 | End: 2018-09-10 | Stop reason: SDUPTHER

## 2018-08-06 RX ORDER — IBUPROFEN 200 MG
800 TABLET ORAL 2 TIMES DAILY PRN
COMMUNITY

## 2018-08-06 NOTE — PROGRESS NOTES
CHIEF COMPLAINT  Pt states his feet pain (like needles and pins and numbness) overall is unchanged,with the exception of more episodes of brief shooting sensations in either foot, on a daily basis.    Subjective   Tello Eller is a 52 y.o. male  who presents to the office for follow-up.He has a history of chronic peripheral neuropathy. Since last visit their pain has not changed. No change in activity. Medication still helping. No side effects. Burning is constant and fluctuates.   The patient states their pain is a 6 on a scale of 1-10.  The patient describes this pain as constant burning, tingling.  The pain is located in bilateral foot and does not radiate. This painful problem is aggravated by physical activity and standing and is alleviated by pain medication and relaxation.    Foot Pain   This is a chronic problem. The current episode started more than 1 year ago. The problem has been gradually worsening. Associated symptoms include arthralgias (hands,thumbs bilat.) and numbness (bilateral feet). Pertinent negatives include no congestion, coughing, fatigue, nausea, rash or weakness (bilateral feet). The symptoms are aggravated by walking. He has tried oral narcotics for the symptoms. The treatment provided mild relief.        Past pain medications:   Gabapentin/neurontin 300 mg qid- didn't help with pain, had mental issues   Lyrica (could not afford) - never taking.   Diclofenac/zipsor  Elavil 25 mg - drowsy  Compound cream - no approved  Diclofenac bid - didn't work as well as aleve  Cymbalta 30 mg am x 6 months  Hydrocodone 7.5/325 mg every other day (not much benefit, helps him sleep) - last dose was this am.       Current pain medications:   Advil/aleve back and body  norco bid - help      Past therapies:  Physical Therapy: no  Chiropractor: yes  Massage Therapy: no  TENS: no  Neck or back surgery: no      Previous Injections: none    PEG Assessment   What number best describes your pain on average in  "the past week?6  What number best describes how, during the past week, pain has interfered with your enjoyment of life?6  What number best describes how, during the past week, pain has interfered with your general activity?  6    The following portions of the patient's history were reviewed and updated as appropriate: allergies, current medications, past family history, past medical history, past social history, past surgical history and problem list.    Review of Systems   Constitutional: Negative for activity change and fatigue.   HENT: Negative for congestion.    Eyes: Negative for visual disturbance.   Respiratory: Negative for apnea, cough, shortness of breath and wheezing.    Cardiovascular: Negative.    Gastrointestinal: Negative for constipation, diarrhea and nausea.   Genitourinary: Negative for difficulty urinating.   Musculoskeletal: Positive for arthralgias (hands,thumbs bilat.).   Skin: Negative for rash and wound.   Allergic/Immunologic: Negative for immunocompromised state.   Neurological: Positive for numbness (bilateral feet). Negative for weakness (bilateral feet).   Hematological: Does not bruise/bleed easily.   Psychiatric/Behavioral: Negative for sleep disturbance and suicidal ideas. The patient is not nervous/anxious.    All other systems reviewed and are negative.      Vitals:    08/06/18 1403   BP: 130/79   Pulse: 89   Resp: 16   Temp: 98 °F (36.7 °C)   SpO2: 94%   Weight: 111 kg (245 lb)   Height: 182.9 cm (72.01\")   PainSc: 6  Comment: Bilateral feet pain ranges from 3-7/10   PainLoc: Foot     Last UDS results reviewed: 06/05/18   Last UDS: 4/2018  Comments: Consistent      Date of last RONALD reviewed : 06/05/18   Comments: Consistent     Objective   Physical Exam   Constitutional: He is oriented to person, place, and time. Vital signs are normal. He appears well-developed and well-nourished.  Non-toxic appearance. No distress.   HENT:   Head: Normocephalic and atraumatic.   Nose: Nose " normal.   Eyes: Conjunctivae and EOM are normal.   Neck: Trachea normal. Neck supple.   Cardiovascular: Normal rate.    Pulmonary/Chest: Effort normal. No respiratory distress.   Musculoskeletal:        Right foot: There is tenderness. There is normal range of motion.        Left foot: There is tenderness. There is normal range of motion.   Neurological: He is alert and oriented to person, place, and time. Gait normal.   dysthesias of BLE's    Skin: Skin is warm and dry. No rash noted. He is not diaphoretic.   Psychiatric: He has a normal mood and affect. His speech is normal and behavior is normal.   Nursing note and vitals reviewed.      Assessment/Plan   Tello was seen today for foot pain.    Diagnoses and all orders for this visit:    Idiopathic peripheral neuropathy  -     HYDROcodone-acetaminophen (NORCO) 7.5-325 MG per tablet; Take 1 tablet by mouth 2 (Two) Times a Day As Needed for Moderate Pain .    Pain in both lower extremities    Chronic pain syndrome    Other chronic pain  -     HYDROcodone-acetaminophen (NORCO) 7.5-325 MG per tablet; Take 1 tablet by mouth 2 (Two) Times a Day As Needed for Moderate Pain .        - Patient appears stable with current regimen. No adverse effects. Regarding continuation of opioids, there is no evidence of aberrant behavior or any red flags. The patient continues with appropriate response to opioid therapy. ADL's remain intact by self.    - Continue norco of 7.5/325 mg bid.   - Continue aleve/ibuprofen for joint pain. Not to go over max dose.  - Side effects of NSAIDS explained as including but not limited to upset stomach, stomach ulcers, bleeding, kidney failure, fluid retention or high blood pressure.  - Last UDS performed was reviewed and consistent. Random urine drug screen per office policy AT NEXT VISIT.      - Continue home exercise program.   - Discussed with the patient regarding long-term side effects of opioids including but not limited to opioid induced  hormonal suppression, hyperalgesia, possible opioid induced altered immune system, and elevated risk of myocardial infarction.  - is considering [facility] system and 2nd opinion.    - This was a 25 minute face to face visit with 15 minutes spent counseling on medication, usage and treatment plan.     --- Follow-up 2 months               RONALD REPORT    As part of the patient's treatment plan, I am prescribing controlled substances. The patient has been made aware of appropriate use of such medications, including potential risk of somnolence, limited ability to drive and/or work safely, and the potential for dependence or overdose. It has also bee made clear that these medications are for use by this patient only, without concomitant use of alcohol or other substances unless prescribed.     Patient has completed prescribing agreement detailing terms of continued prescribing of controlled substances, including monitoring RONALD reports, urine drug screening, and pill counts if necessary. The patient is aware that inappropriate use will results in cessation of prescribing such medications.    RONALD report has been reviewed and scanned into the patient's chart.    As the clinician, I personally reviewed the RONALD from 08/06/18  while the patient was in the office today.    History and physical exam exhibit continued safe and appropriate use of controlled substances.      EMR Dragon/Transcription disclaimer:   Much of this encounter note is an electronic transcription/translation of spoken language to printed text. The electronic translation of spoken language may permit erroneous, or at times, nonsensical words or phrases to be inadvertently transcribed; Although I have reviewed the note for such errors, some may still exist.

## 2018-09-10 DIAGNOSIS — G89.29 OTHER CHRONIC PAIN: ICD-10-CM

## 2018-09-10 DIAGNOSIS — G60.9 IDIOPATHIC PERIPHERAL NEUROPATHY: ICD-10-CM

## 2018-09-10 RX ORDER — HYDROCODONE BITARTRATE AND ACETAMINOPHEN 7.5; 325 MG/1; MG/1
1 TABLET ORAL 2 TIMES DAILY PRN
Qty: 60 TABLET | Refills: 0 | Status: SHIPPED | OUTPATIENT
Start: 2018-09-10 | End: 2018-10-02 | Stop reason: SDUPTHER

## 2018-09-10 NOTE — TELEPHONE ENCOUNTER
Medication Refill Request    Date of phone call: 9-7-18    Medication being requested: Hydrocodone-apap 7.5-325 mg sig: Take 1 tablet by mouth 2 times a day PRN moderate pain  Qty: 60 tablets    Date of last visit: 8-06-18    Date of last refill: 8-06-18    RONALD up to date?: 8-03-18    Next Follow up?: 10-02-18    Any new pertinent information? (i.e, new medication allergies, new use of medications, change in patient's health or condition, non-compliance or inconsistency with prescribing agreement?): n/a

## 2018-10-02 ENCOUNTER — RESULTS ENCOUNTER (OUTPATIENT)
Dept: PAIN MEDICINE | Facility: CLINIC | Age: 53
End: 2018-10-02

## 2018-10-02 ENCOUNTER — OFFICE VISIT (OUTPATIENT)
Dept: PAIN MEDICINE | Facility: CLINIC | Age: 53
End: 2018-10-02

## 2018-10-02 VITALS
SYSTOLIC BLOOD PRESSURE: 130 MMHG | DIASTOLIC BLOOD PRESSURE: 87 MMHG | RESPIRATION RATE: 18 BRPM | TEMPERATURE: 98.1 F | OXYGEN SATURATION: 96 % | HEIGHT: 72 IN | HEART RATE: 90 BPM | BODY MASS INDEX: 33.08 KG/M2 | WEIGHT: 244.2 LBS

## 2018-10-02 DIAGNOSIS — G60.9 IDIOPATHIC PERIPHERAL NEUROPATHY: ICD-10-CM

## 2018-10-02 DIAGNOSIS — G89.29 OTHER CHRONIC PAIN: ICD-10-CM

## 2018-10-02 PROCEDURE — 99213 OFFICE O/P EST LOW 20 MIN: CPT | Performed by: PAIN MEDICINE

## 2018-10-02 RX ORDER — HYDROCODONE BITARTRATE AND ACETAMINOPHEN 7.5; 325 MG/1; MG/1
1 TABLET ORAL 2 TIMES DAILY PRN
Qty: 60 TABLET | Refills: 0 | Status: SHIPPED | OUTPATIENT
Start: 2018-10-02 | End: 2018-11-20 | Stop reason: SDUPTHER

## 2018-10-02 NOTE — PROGRESS NOTES
CHIEF COMPLAINT: Foot Pain    HPI  Tello Eller is a 53 y.o. male.  He is here to follow up for Foot Pain    Tello Eller is a 53 y.o. male  who presents to the office for follow-up.    Since last visit their pain has remain unchanged. Worse pain is still PN in bilateral feet. Worse with standing and walking at work. Controlled with medication. Has good and bad days. Intermittent burning pain. No radiation.     Past pain medications:   Gabapentin/neurontin 300 mg qid- didn't help with pain, had mental issues   Lyrica (could not afford) - never taking.   Diclofenac/zipsor  Elavil 25 mg - drowsy  Compound cream - no approved  Diclofenac bid - didn't work as well as aleve  Cymbalta 30 mg am x 6 months  Hydrocodone 7.5/325 mg every other day (not much benefit, helps him sleep) - last dose was this am.       Current pain medications:   Advil/aleve back and body  norco bid - help      Past therapies:  Physical Therapy: no  Chiropractor: yes  Massage Therapy: no  TENS: no  Neck or back surgery: no      Previous Injections: none    PEG Assessment   What number best describes your pain on average in the past week? 6  What number best describes how, during the past week, pain has interfered with your enjoyment of life? 7  What number best describes how, during the past week, pain has interfered with your general activity? 7      Current Outpatient Prescriptions:   •  atorvastatin (LIPITOR) 40 MG tablet, Take 1 tablet by mouth Daily., Disp: 90 tablet, Rfl: 1  •  HYDROcodone-acetaminophen (NORCO) 7.5-325 MG per tablet, Take 1 tablet by mouth 2 (Two) Times a Day As Needed for Moderate Pain ., Disp: 60 tablet, Rfl: 0  •  ibuprofen (ADVIL,MOTRIN) 200 MG tablet, Take 800 mg by mouth 2 (Two) Times a Day As Needed for Mild Pain ., Disp: , Rfl:   •  lisinopril (PRINIVIL,ZESTRIL) 20 MG tablet, TAKE 1 TABLET BY MOUTH DAILY. FOR BLOOD PRESSURE, Disp: 90 tablet, Rfl: 0  •  naproxen sodium (ALEVE) 220 MG tablet, Take 440 mg by  "mouth 2 (Two) Times a Day As Needed., Disp: , Rfl:     PFSH:  The following portions of the patient's history were reviewed and updated as appropriate: problem list, past medical history, past surgery history, social history, family history, medications, and allergies    Review of Systems   Constitutional: Negative for fatigue.   HENT: Negative for congestion.    Eyes: Negative for visual disturbance.   Respiratory: Negative for cough, shortness of breath and wheezing.    Cardiovascular: Negative.    Gastrointestinal: Negative for constipation and diarrhea.   Genitourinary: Negative for difficulty urinating.   Musculoskeletal:        Bilateral feet   Neurological: Positive for numbness (bilateral feet). Negative for weakness.   Psychiatric/Behavioral: Negative for sleep disturbance and suicidal ideas. The patient is not nervous/anxious.    All other systems reviewed and are negative.      Vitals:    10/02/18 0900   BP: 130/87   Pulse: 90   Resp: 18   Temp: 98.1 °F (36.7 °C)   SpO2: 96%   Weight: 111 kg (244 lb 3.2 oz)   Height: 182.9 cm (72.01\")   PainSc:   6   PainLoc: Foot       Physical Exam   Constitutional: He is oriented to person, place, and time. Vital signs are normal. He appears well-developed and well-nourished.  Non-toxic appearance. No distress.   HENT:   Head: Normocephalic and atraumatic.   Right Ear: External ear normal.   Left Ear: External ear normal.   Nose: Nose normal.   Mouth/Throat: Oropharynx is clear and moist.   Eyes: Conjunctivae and EOM are normal.   Neck: Trachea normal. Neck supple.   Cardiovascular: Normal rate.    Pulmonary/Chest: Effort normal. No respiratory distress.   Musculoskeletal:        Right foot: There is tenderness. There is normal range of motion.        Left foot: There is tenderness. There is normal range of motion.   Neurological: He is alert and oriented to person, place, and time. Gait normal.   dysthesias of BLE's    Skin: Skin is warm and dry. No rash noted. He is " not diaphoretic.   Psychiatric: He has a normal mood and affect. His speech is normal and behavior is normal. Thought content normal. Cognition and memory are normal.   Nursing note and vitals reviewed.    Ortho Exam  Neurologic Exam     Mental Status   Oriented to person, place, and time.   Speech: speech is normal     Cranial Nerves     CN III, IV, VI   Extraocular motions are normal.       Lab Results   Component Value Date    POCMETH Negative 04/10/2018    POCAMPHET Negative 04/10/2018    POCBARBITUR Negative 04/10/2018    POCBENZO Negative 04/10/2018    POCCOCAINE Negative 04/10/2018    POCMETHADO Negative 04/10/2018    POCOPIATES Positive (A) 04/10/2018    POCOXYCODO Negative 04/10/2018    POCPHENCYC Negative 04/10/2018    POCPROPOXY Negative 04/10/2018    POCTHC Negative 04/10/2018    POCTRICYC Negative 04/10/2018     Last UDS results reviewed: 10/03/18   Last UDS: 4/2018  Comments: Consistent     Date of last RONALD reviewed : 10/03/18   Comments: Axel         Tello was seen today for foot pain.    Diagnoses and all orders for this visit:    Idiopathic peripheral neuropathy  -     HYDROcodone-acetaminophen (NORCO) 7.5-325 MG per tablet; Take 1 tablet by mouth 2 (Two) Times a Day As Needed for Moderate Pain .  -     Oral Fluid Drug Screen - Saliva, Oral Cavity; Future    Other chronic pain  -     HYDROcodone-acetaminophen (NORCO) 7.5-325 MG per tablet; Take 1 tablet by mouth 2 (Two) Times a Day As Needed for Moderate Pain .  -     Oral Fluid Drug Screen - Saliva, Oral Cavity; Future      Requested Prescriptions     Signed Prescriptions Disp Refills   • HYDROcodone-acetaminophen (NORCO) 7.5-325 MG per tablet 60 tablet 0     Sig: Take 1 tablet by mouth 2 (Two) Times a Day As Needed for Moderate Pain .     - Patient appears stable with current regimen. No adverse effects. Regarding continuation of opioids, there is no evidence of aberrant behavior or any red flags. The patient continues with appropriate  response to opioid therapy. ADL's remain intact by self.    - Continue norco of 7.5/325 mg bid.   - Last UDS performed was reviewed and consistent. Random urine drug screen per office policy today, to be checked at next visit. Can not leave UDS - will obtain oral swab.    - Continue home exercise program.   - Discussed with the patient regarding long-term side effects of opioids including but not limited to opioid induced hormonal suppression, hyperalgesia, possible opioid induced altered immune system, and elevated risk of myocardial infarction.  - is considering SCS system and 2nd opinion.    - wants to try lyrica once it goes generic - can not afford, even with co pay card.     Wt Readings from Last 3 Encounters:   10/02/18 111 kg (244 lb 3.2 oz)   08/06/18 111 kg (245 lb)   06/05/18 112 kg (246 lb 3.2 oz)     Body mass index is 33.11 kg/m². Patient counseled on the importance of weight loss to help with overall health and pain control. Patient instructed to attempt weight loss.   Plan: Calorie counting  cut out extra servings and reduce fast food intake    Follow-up in 2 months.    Marlin Blanco MD  Pain Management     RONALD REPORT  As part of the patient's treatment plan, I am prescribing controlled substances. The patient has been made aware of appropriate use of such medications, including potential risk of somnolence, limited ability to drive and/or work safely, and the potential for dependence or overdose. It has also bee made clear that these medications are for use by this patient only, without concomitant use of alcohol or other substances unless prescribed.   Patient has completed prescribing agreement detailing terms of continued prescribing of controlled substances, including monitoring RONALD reports, urine drug screening, and pill counts if necessary. The patient is aware that inappropriate use will results in cessation of prescribing such medications.  RONALD report has been reviewed and  scanned into the patient's chart.  History and physical exam exhibit continued safe and appropriate use of controlled substances.

## 2018-10-22 RX ORDER — LISINOPRIL 20 MG/1
20 TABLET ORAL DAILY
Qty: 90 TABLET | Refills: 0 | OUTPATIENT
Start: 2018-10-22

## 2018-11-20 DIAGNOSIS — G60.9 IDIOPATHIC PERIPHERAL NEUROPATHY: ICD-10-CM

## 2018-11-20 DIAGNOSIS — G89.29 OTHER CHRONIC PAIN: ICD-10-CM

## 2018-11-20 RX ORDER — HYDROCODONE BITARTRATE AND ACETAMINOPHEN 7.5; 325 MG/1; MG/1
1 TABLET ORAL 2 TIMES DAILY PRN
Qty: 60 TABLET | Refills: 0 | Status: SHIPPED | OUTPATIENT
Start: 2018-11-20 | End: 2018-12-06 | Stop reason: SDUPTHER

## 2018-11-20 NOTE — TELEPHONE ENCOUNTER
Medication Refill Request    Date of phone call: 11-16-18    Medication being requested: Hydrocodone-apap 7.5-325 mg sig: Take 1 tablet by mouth 2 times a day  Qty: 60 tablets    Date of last visit: 10-02-18    Date of last refill: 10-02-18    RONALD up to date?: 10-01-18    Next Follow up?: 12-04-18    Any new pertinent information? (i.e, new medication allergies, new use of medications, change in patient's health or condition, non-compliance or inconsistency with prescribing agreement?): n/a

## 2018-12-06 ENCOUNTER — OFFICE VISIT (OUTPATIENT)
Dept: PAIN MEDICINE | Facility: CLINIC | Age: 53
End: 2018-12-06

## 2018-12-06 VITALS
WEIGHT: 254 LBS | DIASTOLIC BLOOD PRESSURE: 79 MMHG | OXYGEN SATURATION: 96 % | SYSTOLIC BLOOD PRESSURE: 138 MMHG | HEIGHT: 72 IN | TEMPERATURE: 97.5 F | BODY MASS INDEX: 34.4 KG/M2 | RESPIRATION RATE: 15 BRPM | HEART RATE: 81 BPM

## 2018-12-06 DIAGNOSIS — G60.9 IDIOPATHIC PERIPHERAL NEUROPATHY: Primary | ICD-10-CM

## 2018-12-06 DIAGNOSIS — G89.29 OTHER CHRONIC PAIN: ICD-10-CM

## 2018-12-06 DIAGNOSIS — M79.604 PAIN IN BOTH LOWER EXTREMITIES: ICD-10-CM

## 2018-12-06 DIAGNOSIS — M79.605 PAIN IN BOTH LOWER EXTREMITIES: ICD-10-CM

## 2018-12-06 DIAGNOSIS — G89.4 CHRONIC PAIN SYNDROME: ICD-10-CM

## 2018-12-06 PROCEDURE — 99213 OFFICE O/P EST LOW 20 MIN: CPT | Performed by: PAIN MEDICINE

## 2018-12-06 RX ORDER — HYDROCODONE BITARTRATE AND ACETAMINOPHEN 7.5; 325 MG/1; MG/1
1 TABLET ORAL 2 TIMES DAILY PRN
Qty: 60 TABLET | Refills: 0 | Status: SHIPPED | OUTPATIENT
Start: 2018-12-06 | End: 2019-01-18 | Stop reason: SDUPTHER

## 2018-12-06 NOTE — PROGRESS NOTES
CHIEF COMPLAINT    F/U foot pain. Pt states unchanged since last visit.     Subjective   Tello Eller is a 53 y.o. male  who presents to the office for follow-up.He has a history of PN.    History of Present Illness   Since last visit their pain has remain unchanged. Worse pain is still PN in bilateral feet. Worse with standing and walking at work. Controlled with medication. Has good and bad days. Intermittent burning pain. No radiation.     Past pain medications:   Gabapentin/neurontin 300 mg qid- didn't help with pain, had mental issues   Lyrica (could not afford) - never taking.   Diclofenac/zipsor  Elavil 25 mg - drowsy  Compound cream - no approved  Diclofenac bid - didn't work as well as aleve  Cymbalta 30 mg am x 6 months  Hydrocodone 7.5/325 mg every other day (not much benefit, helps him sleep) - last dose was this am.       Current pain medications:   Advil/aleve back and body  norco bid - help      Past therapies:  Physical Therapy: no  Chiropractor: yes  Massage Therapy: no  TENS: no  Neck or back surgery: no      Previous Injections: none    PEG Assessment   What number best describes your pain on average in the past week?6  What number best describes how, during the past week, pain has interfered with your enjoyment of life?7  What number best describes how, during the past week, pain has interfered with your general activity?  7      The following portions of the patient's history were reviewed and updated as appropriate: allergies, current medications, past family history, past medical history, past social history, past surgical history and problem list.    Review of Systems   Constitutional: Negative for chills, fatigue and fever.   HENT: Negative for congestion.    Eyes: Negative for visual disturbance.   Respiratory: Negative for cough, shortness of breath and wheezing.    Cardiovascular: Negative.    Gastrointestinal: Negative for constipation and diarrhea.   Genitourinary: Negative for  "difficulty urinating.   Musculoskeletal:        Bilateral feet   Skin: Negative for rash and wound.   Allergic/Immunologic: Negative for immunocompromised state.   Neurological: Positive for numbness (bilateral feet). Negative for dizziness, weakness, light-headedness and headaches.   Hematological: Does not bruise/bleed easily.   Psychiatric/Behavioral: Negative for sleep disturbance and suicidal ideas. The patient is not nervous/anxious.    All other systems reviewed and are negative.      Vitals:    12/06/18 0832 12/06/18 0837   BP: 155/96 138/79   Pulse: 81    Resp: 15    Temp: 97.5 °F (36.4 °C)    SpO2: 96%    Weight: 115 kg (254 lb)    Height: 182.9 cm (72.01\")    PainSc:   7    PainLoc: Foot          Objective   Physical Exam   Constitutional: He is oriented to person, place, and time. Vital signs are normal. He appears well-developed and well-nourished.  Non-toxic appearance. No distress.   HENT:   Head: Normocephalic and atraumatic.   Right Ear: External ear normal.   Left Ear: External ear normal.   Nose: Nose normal.   Mouth/Throat: Oropharynx is clear and moist.   Eyes: Conjunctivae and EOM are normal.   Neck: Trachea normal. Neck supple.   Cardiovascular: Normal rate.   Pulmonary/Chest: Effort normal. No respiratory distress.   Musculoskeletal:        Right foot: There is tenderness. There is normal range of motion.        Left foot: There is tenderness. There is normal range of motion.   Wearing shoes and socks   Neurological: He is alert and oriented to person, place, and time. Gait normal.   dysthesias of BLE's    Skin: Skin is warm and dry. No rash noted. He is not diaphoretic.   Psychiatric: He has a normal mood and affect. His speech is normal and behavior is normal. Thought content normal. Cognition and memory are normal.   Nursing note and vitals reviewed.          Assessment/Plan   Tello was seen today for foot pain.    Diagnoses and all orders for this visit:    Idiopathic peripheral " neuropathy  -     HYDROcodone-acetaminophen (NORCO) 7.5-325 MG per tablet; Take 1 tablet by mouth 2 (Two) Times a Day As Needed for Moderate Pain .    Chronic pain syndrome    Pain in both lower extremities    Other chronic pain  -     HYDROcodone-acetaminophen (NORCO) 7.5-325 MG per tablet; Take 1 tablet by mouth 2 (Two) Times a Day As Needed for Moderate Pain .        - will place referral to new PCP referral bank.  Has stopped taking both his blood pressure and cholesterol medication due to not liking being on so much medication and being unsure if he needs it.  Discussed the risk of stopping these medications especially without physician oversight.  He is currently looking for a new primary care physician will place referral.    - went over medications again for PN.  He has tried and failed multiple medication for peripheral neuropathy.  The only medication he can tolerate that has been helpful as his narcotic medication.    - Patient appears stable with current regimen. No adverse effects. Regarding continuation of opioids, there is no evidence of aberrant behavior or any red flags. The patient continues with appropriate response to opioid therapy. ADL's remain intact by self.    - Continue norco of 7.5/325 mg bid.   - Last UDS performed 10/2018- was reviewed and consistent.   - Continue home exercise program.   - Discussed with the patient regarding long-term side effects of opioids including but not limited to opioid induced hormonal suppression, hyperalgesia, possible opioid induced altered immune system, and elevated risk of myocardial infarction.  - is considering SCS system for PN.   - wants to try lyrica once it goes generic - can not afford, even with co pay card.    --- Follow-up 2 months               RONALD REPORT    As part of the patient's treatment plan, I am prescribing controlled substances. The patient has been made aware of appropriate use of such medications, including potential risk of  somnolence, limited ability to drive and/or work safely, and the potential for dependence or overdose. It has also bee made clear that these medications are for use by this patient only, without concomitant use of alcohol or other substances unless prescribed.     Patient has completed prescribing agreement detailing terms of continued prescribing of controlled substances, including monitoring RONALD reports, urine drug screening, and pill counts if necessary. The patient is aware that inappropriate use will results in cessation of prescribing such medications.    RONALD report has been reviewed and scanned into the patient's chart.    As the clinician, I personally reviewed the RONALD from 12/06/18  while the patient was in the office today.    History and physical exam exhibit continued safe and appropriate use of controlled substances.      EMR Dragon/Transcription disclaimer:   Much of this encounter note is an electronic transcription/translation of spoken language to printed text. The electronic translation of spoken language may permit erroneous, or at times, nonsensical words or phrases to be inadvertently transcribed; Although I have reviewed the note for such errors, some may still exist.

## 2019-01-18 DIAGNOSIS — G89.29 OTHER CHRONIC PAIN: ICD-10-CM

## 2019-01-18 DIAGNOSIS — G60.9 IDIOPATHIC PERIPHERAL NEUROPATHY: ICD-10-CM

## 2019-01-18 RX ORDER — HYDROCODONE BITARTRATE AND ACETAMINOPHEN 7.5; 325 MG/1; MG/1
1 TABLET ORAL 2 TIMES DAILY PRN
Qty: 60 TABLET | Refills: 0 | Status: SHIPPED | OUTPATIENT
Start: 2019-01-18 | End: 2019-02-05 | Stop reason: SDUPTHER

## 2019-01-18 NOTE — TELEPHONE ENCOUNTER
Medication Refill Request    Date of phone call: 1-17-19    Medication being requested: Hydrocodone-apap 7.5-325 mg sig: Take 1 tablet by mouth 2 times a day as needed for moderate pain  Qty: 60 tablets    Date of last visit: 12-06-18    Date of last refill: 12/06/18    RONALD up to date?: 12/05/18    Next Follow up?: 02/05/19    Any new pertinent information? (i.e, new medication allergies, new use of medications, change in patient's health or condition, non-compliance or inconsistency with prescribing agreement?): n/a, do you need an updated ronald?

## 2019-02-05 ENCOUNTER — OFFICE VISIT (OUTPATIENT)
Dept: PAIN MEDICINE | Facility: CLINIC | Age: 54
End: 2019-02-05

## 2019-02-05 VITALS
DIASTOLIC BLOOD PRESSURE: 92 MMHG | WEIGHT: 260.4 LBS | RESPIRATION RATE: 18 BRPM | TEMPERATURE: 98.3 F | OXYGEN SATURATION: 98 % | HEIGHT: 72 IN | SYSTOLIC BLOOD PRESSURE: 150 MMHG | BODY MASS INDEX: 35.27 KG/M2 | HEART RATE: 74 BPM

## 2019-02-05 DIAGNOSIS — G60.9 IDIOPATHIC PERIPHERAL NEUROPATHY: Primary | ICD-10-CM

## 2019-02-05 DIAGNOSIS — G89.4 CHRONIC PAIN SYNDROME: ICD-10-CM

## 2019-02-05 DIAGNOSIS — G89.29 OTHER CHRONIC PAIN: ICD-10-CM

## 2019-02-05 PROCEDURE — 99214 OFFICE O/P EST MOD 30 MIN: CPT | Performed by: PAIN MEDICINE

## 2019-02-05 RX ORDER — HYDROCODONE BITARTRATE AND ACETAMINOPHEN 7.5; 325 MG/1; MG/1
TABLET ORAL
Qty: 75 TABLET | Refills: 0 | Status: SHIPPED | OUTPATIENT
Start: 2019-02-05 | End: 2019-02-20 | Stop reason: SDUPTHER

## 2019-02-05 NOTE — PROGRESS NOTES
CHIEF COMPLAINT: Foot Pain    HPI  Tello Eller is a 53 y.o. male.  He is here to follow up for Foot Pain    Tello Eller is a 53 y.o. male  who presents to the office for follow-up.    Since last visit their pain has remain unchanged. Stable on current pain medication regimen. No changes.   The patient states their pain is a 7 on a scale of 1-10.  The patient describes this pain as constant numbness, burning and stinging.  The pain is located in bilateral feet and does not radiate. This painful problem is aggravated by physical activity, work, standing, weight bearing and walking and is alleviated by pain medication.    Past pain medications:   Gabapentin/neurontin 300 mg qid- didn't help with pain, had mental issues   Lyrica (could not afford) - never taking.   Diclofenac/zipsor  Elavil 25 mg - drowsy  Compound cream - no approved  Diclofenac bid - didn't work as well as aleve  Cymbalta 30 mg am x 6 months    PEG Assessment   What number best describes your pain on average in the past week? 7  What number best describes how, during the past week, pain has interfered with your enjoyment of life? 7  What number best describes how, during the past week, pain has interfered with your general activity? 7      Current Outpatient Medications:   •  HYDROcodone-acetaminophen (NORCO) 7.5-325 MG per tablet, Take 1 tablet po 2-3 times a day prn pain, Disp: 75 tablet, Rfl: 0  •  ibuprofen (ADVIL,MOTRIN) 200 MG tablet, Take 800 mg by mouth 2 (Two) Times a Day As Needed for Mild Pain ., Disp: , Rfl:   •  naproxen sodium (ALEVE) 220 MG tablet, Take 440 mg by mouth 2 (Two) Times a Day As Needed., Disp: , Rfl:     PFSH:  The following portions of the patient's history were reviewed and updated as appropriate: problem list, past medical history, past surgery history, social history, family history, medications, and allergies    Review of Systems   Constitutional: Negative for fatigue.   HENT: Negative for congestion.   "  Eyes: Negative for visual disturbance.   Respiratory: Negative for cough, shortness of breath and wheezing.    Cardiovascular: Negative.    Gastrointestinal: Negative for constipation and diarrhea.   Genitourinary: Negative for difficulty urinating.   Musculoskeletal: Positive for arthralgias (bilateral feet and ankles).   Neurological: Positive for numbness (bilateral feet). Negative for weakness.   Psychiatric/Behavioral: Negative for sleep disturbance and suicidal ideas. The patient is not nervous/anxious.    All other systems reviewed and are negative.      Vitals:    02/05/19 0908   BP: 150/92   Pulse: 74   Resp: 18   Temp: 98.3 °F (36.8 °C)   SpO2: 98%   Weight: 118 kg (260 lb 6.4 oz)   Height: 182.9 cm (72.01\")   PainSc:   7   PainLoc: Foot       Physical Exam   Constitutional: He is oriented to person, place, and time. Vital signs are normal. He appears well-developed and well-nourished.  Non-toxic appearance. No distress.   HENT:   Head: Normocephalic and atraumatic.   Right Ear: External ear normal.   Left Ear: External ear normal.   Nose: Nose normal.   Mouth/Throat: Oropharynx is clear and moist.   Eyes: Conjunctivae and EOM are normal.   Neck: Trachea normal. Neck supple.   Cardiovascular: Normal rate.   Pulmonary/Chest: Effort normal. No respiratory distress.   Musculoskeletal:        Right foot: There is tenderness. There is normal range of motion, no swelling and no deformity.        Left foot: There is tenderness. There is normal range of motion, no swelling and no deformity.   Wearing shoes and socks   Neurological: He is alert and oriented to person, place, and time. Gait normal.   dysthesias of BLE's    Skin: Skin is warm and dry. No rash noted. He is not diaphoretic.   Psychiatric: He has a normal mood and affect. His speech is normal and behavior is normal. Thought content normal. Cognition and memory are normal.   Nursing note and vitals reviewed.    Ortho Exam  Neurologic Exam     Mental " Status   Oriented to person, place, and time.   Speech: speech is normal     Cranial Nerves     CN III, IV, VI   Extraocular motions are normal.       Lab Results   Component Value Date    POCMETH Negative 04/10/2018    POCAMPHET Negative 04/10/2018    POCBARBITUR Negative 04/10/2018    POCBENZO Negative 04/10/2018    POCCOCAINE Negative 04/10/2018    POCMETHADO Negative 04/10/2018    POCOPIATES Positive (A) 04/10/2018    POCOXYCODO Negative 04/10/2018    POCPHENCYC Negative 04/10/2018    POCPROPOXY Negative 04/10/2018    POCTHC Negative 04/10/2018    POCTRICYC Negative 04/10/2018     Last UDS results reviewed: 02/05/19   Last UDS: 10/2018  Comments: Consistent     Date of last RONALD reviewed : 02/05/19   Comments: Axle         Tello was seen today for foot pain.    Diagnoses and all orders for this visit:    Idiopathic peripheral neuropathy  -     Ambulatory Referral to Psychology  -     HYDROcodone-acetaminophen (NORCO) 7.5-325 MG per tablet; Take 1 tablet po 2-3 times a day prn pain    Chronic pain syndrome  -     Ambulatory Referral to Psychology    Other chronic pain  -     HYDROcodone-acetaminophen (NORCO) 7.5-325 MG per tablet; Take 1 tablet po 2-3 times a day prn pain      Requested Prescriptions     Signed Prescriptions Disp Refills   • HYDROcodone-acetaminophen (NORCO) 7.5-325 MG per tablet 75 tablet 0     Sig: Take 1 tablet po 2-3 times a day prn pain     - referral to new PCP referral bank- placed at last visit. Received phone call but has not called back.  Has stopped taking both his blood pressure and cholesterol medication due to not liking being on so much medication and being unsure if he needs it.  Discussed the risk of stopping these medications especially without physician oversight.  He is currently looking for a new primary care physician. encouraged him to set up.      - went over medications again for PN.  He has tried and failed multiple medication for peripheral neuropathy.  The  only medication he can tolerate that has been helpful as his narcotic medication. Asking to increase frequency - good results with medication but not lasting. Will increase from #60 to #75 for the month. Discussed slippery slope with increasing pain medication and tolerance.      - Patient appears stable with current regimen. No adverse effects. Regarding continuation of opioids, there is no evidence of aberrant behavior or any red flags. The patient continues with appropriate response to opioid therapy. ADL's remain intact by self.    - Continue norco of 7.5/325 mg.   - Last UDS performed 10/2018- was reviewed and consistent. Random urine drug screen per office policy AT NEXT VISIT.   - Continue home exercise program.   - Discussed with the patient regarding long-term side effects of opioids including but not limited to opioid induced hormonal suppression, hyperalgesia, possible opioid induced altered immune system, and elevated risk of myocardial infarction.  - is considering [facility]CS system for PN. BS handout given. asking to move forward today. Will place psych referral and education with company.   - wants to try lyrica once it goes generic - can not afford, even with co pay card.    - This was a 25 minute face to face visit with 15 minutes spent counseling on medication, usage and treatment plan.      Wt Readings from Last 3 Encounters:   02/05/19 118 kg (260 lb 6.4 oz)   12/06/18 115 kg (254 lb)   10/02/18 111 kg (244 lb 3.2 oz)     Body mass index is 35.31 kg/m². Patient counseled on the importance of weight loss to help with overall health and pain control. Patient instructed to attempt weight loss.   Plan: Calorie counting  reduce portion size, cut out extra servings and reduce fast food intake    Follow-up in 2 months.    Marlin Blanco MD  Pain Management     [facility] REPORT  As part of the patient's treatment plan, I am prescribing controlled substances. The patient has been made aware of appropriate  use of such medications, including potential risk of somnolence, limited ability to drive and/or work safely, and the potential for dependence or overdose. It has also bee made clear that these medications are for use by this patient only, without concomitant use of alcohol or other substances unless prescribed.   Patient has completed prescribing agreement detailing terms of continued prescribing of controlled substances, including monitoring RONALD reports, urine drug screening, and pill counts if necessary. The patient is aware that inappropriate use will results in cessation of prescribing such medications.  RONALD report has been reviewed and scanned into the patient's chart.  History and physical exam exhibit continued safe and appropriate use of controlled substances.

## 2019-02-20 DIAGNOSIS — G89.29 OTHER CHRONIC PAIN: ICD-10-CM

## 2019-02-20 DIAGNOSIS — G60.9 IDIOPATHIC PERIPHERAL NEUROPATHY: ICD-10-CM

## 2019-02-20 NOTE — TELEPHONE ENCOUNTER
Medication Refill Request    Date of phone call: 2-18-19    Medication being requested: Hydrocodone-apap 7.5-325 mg sig: Take 1 tablet by mouth 2-3 times a day prn pain  Qty: 75 tablets    Date of last visit: 2-05-19    Date of last refill: 2-05-19    RONALD up to date?: 2-04-19    Next Follow up?: 4-04-19    Any new pertinent information? (i.e, new medication allergies, new use of medications, change in patient's health or condition, non-compliance or inconsistency with prescribing agreement?): n/a

## 2019-02-21 RX ORDER — HYDROCODONE BITARTRATE AND ACETAMINOPHEN 7.5; 325 MG/1; MG/1
TABLET ORAL
Qty: 75 TABLET | Refills: 0 | Status: SHIPPED | OUTPATIENT
Start: 2019-02-21 | End: 2019-03-15 | Stop reason: SDUPTHER

## 2019-03-15 DIAGNOSIS — G60.9 IDIOPATHIC PERIPHERAL NEUROPATHY: ICD-10-CM

## 2019-03-15 DIAGNOSIS — G89.29 OTHER CHRONIC PAIN: ICD-10-CM

## 2019-03-15 RX ORDER — HYDROCODONE BITARTRATE AND ACETAMINOPHEN 7.5; 325 MG/1; MG/1
TABLET ORAL
Qty: 75 TABLET | Refills: 0 | Status: SHIPPED | OUTPATIENT
Start: 2019-03-15 | End: 2019-04-03 | Stop reason: SDUPTHER

## 2019-03-15 NOTE — TELEPHONE ENCOUNTER
Medication Refill Request    Date of phone call: 3/15/19    Medication being requested: Norco 7.5-325 mg si tab 2-3 times daily prn  Qty: 75    Date of last visit: 19    Date of last refill: 19    RONALD up to date?: yes    Next Follow up?: 19    Any new pertinent information? (i.e, new medication allergies, new use of medications, change in patient's health or condition, non-compliance or inconsistency with prescribing agreement?):

## 2019-04-03 DIAGNOSIS — G60.9 IDIOPATHIC PERIPHERAL NEUROPATHY: ICD-10-CM

## 2019-04-03 DIAGNOSIS — G89.29 OTHER CHRONIC PAIN: ICD-10-CM

## 2019-04-03 RX ORDER — HYDROCODONE BITARTRATE AND ACETAMINOPHEN 7.5; 325 MG/1; MG/1
TABLET ORAL
Qty: 38 TABLET | Refills: 0 | Status: SHIPPED | OUTPATIENT
Start: 2019-04-03 | End: 2019-05-14 | Stop reason: SDUPTHER

## 2019-04-03 NOTE — TELEPHONE ENCOUNTER
Patient was scheduled from 4/4 to 5/2 and will need a refill on HYDROcodone-acetaminophen (NORCO) 7.5-325 MG per tablet.   States the his refill would be due around 4/13.

## 2019-04-03 NOTE — TELEPHONE ENCOUNTER
Medication Refill Request    Date of phone call: 4/3/19    Medication being requested: hydro/apap 7.5-325 mg si tab 2-3 times a day  Qty: 75    Date of last visit: 19    Date of last refill: 3/15/19    RONALD up to date?: yes    Next Follow up?: 19    Any new pertinent information? (i.e, new medication allergies, new use of medications, change in patient's health or condition, non-compliance or inconsistency with prescribing agreement?): Appt moved from 19 to 19.

## 2019-04-03 NOTE — TELEPHONE ENCOUNTER
RONALD reviewed - should not be due for refill until 4/19/19 at the earliest --- filled 2/18/19 and then 3/13/19 which was actually 7 days early (should have not been filled until 3/20/19).  Will provide partial that can be filled at that date.

## 2019-05-14 ENCOUNTER — OFFICE VISIT (OUTPATIENT)
Dept: PAIN MEDICINE | Facility: CLINIC | Age: 54
End: 2019-05-14

## 2019-05-14 ENCOUNTER — RESULTS ENCOUNTER (OUTPATIENT)
Dept: PAIN MEDICINE | Facility: CLINIC | Age: 54
End: 2019-05-14

## 2019-05-14 VITALS
HEIGHT: 72 IN | DIASTOLIC BLOOD PRESSURE: 89 MMHG | BODY MASS INDEX: 34.46 KG/M2 | OXYGEN SATURATION: 95 % | SYSTOLIC BLOOD PRESSURE: 135 MMHG | HEART RATE: 83 BPM | RESPIRATION RATE: 18 BRPM | TEMPERATURE: 98.5 F | WEIGHT: 254.41 LBS

## 2019-05-14 DIAGNOSIS — G60.9 IDIOPATHIC PERIPHERAL NEUROPATHY: Primary | ICD-10-CM

## 2019-05-14 DIAGNOSIS — G89.29 OTHER CHRONIC PAIN: ICD-10-CM

## 2019-05-14 DIAGNOSIS — G60.9 IDIOPATHIC PERIPHERAL NEUROPATHY: ICD-10-CM

## 2019-05-14 PROCEDURE — 99213 OFFICE O/P EST LOW 20 MIN: CPT | Performed by: PAIN MEDICINE

## 2019-05-14 RX ORDER — HYDROCODONE BITARTRATE AND ACETAMINOPHEN 7.5; 325 MG/1; MG/1
TABLET ORAL
Qty: 75 TABLET | Refills: 0 | Status: SHIPPED | OUTPATIENT
Start: 2019-05-14 | End: 2019-06-12 | Stop reason: SDUPTHER

## 2019-05-14 NOTE — PROGRESS NOTES
CHIEF COMPLAINT: Foot Pain    HPI  Tello Eller is a 53 y.o. male.  He is here to follow up for Foot Pain    Tello Eller is a 53 y.o. male  who presents to the office for follow-up.    Since last visit their pain has remain unchanged.   Stable on current pain medication regimen. No changes. medication helping. Able to work with less pain.   The patient states their pain is a 6 on a scale of 1-10.  The patient describes this pain as constant numbness, burning and stinging.  The pain is located in bilateral feet and does not radiate. This painful problem is aggravated by physical activity, work, standing, weight bearing and walking and is alleviated by pain medication.    Past pain medications:   Gabapentin/neurontin 300 mg qid- didn't help with pain, had mental issues   Lyrica (could not afford) - never taking.   Diclofenac/zipsor  Elavil 25 mg - drowsy  Compound cream - no approved  Diclofenac bid - didn't work as well as aleve  Cymbalta 30 mg am x 6 months  Hydrocodone 7.5/325 mg every other day (not much benefit, helps him sleep) - last dose was this am.       Current pain medications:   Advil/aleve back and body  norco bid - help      Past therapies:  Physical Therapy: no  Chiropractor: yes  Massage Therapy: no  TENS: no  Neck or back surgery: no      Previous Injections: none    PEG Assessment   What number best describes your pain on average in the past week? 6  What number best describes how, during the past week, pain has interfered with your enjoyment of life? 6  What number best describes how, during the past week, pain has interfered with your general activity? 6      Current Outpatient Medications:   •  HYDROcodone-acetaminophen (NORCO) 7.5-325 MG per tablet, Take 1 tablet po 2-3 times a day prn pain, Disp: 75 tablet, Rfl: 0  •  ibuprofen (ADVIL,MOTRIN) 200 MG tablet, Take 800 mg by mouth 2 (Two) Times a Day As Needed for Mild Pain ., Disp: , Rfl:   •  naproxen sodium (ALEVE) 220 MG tablet, Take  "440 mg by mouth 2 (Two) Times a Day As Needed., Disp: , Rfl:     PFSH:  The following portions of the patient's history were reviewed and updated as appropriate: problem list, past medical history, past surgery history, social history, family history, medications, and allergies    Review of Systems   Constitutional: Negative for fatigue.   HENT: Negative for congestion.    Eyes: Negative for visual disturbance.   Respiratory: Negative for cough, shortness of breath and wheezing.    Cardiovascular: Negative.    Gastrointestinal: Negative for constipation and diarrhea.   Genitourinary: Negative for difficulty urinating.   Neurological: Positive for weakness (bilateral feet) and numbness (bilateral feet).   Psychiatric/Behavioral: Negative for sleep disturbance and suicidal ideas. The patient is not nervous/anxious.    All other systems reviewed and are negative.      Vitals:    05/14/19 1326   BP: 135/89   Pulse: 83   Resp: 18   Temp: 98.5 °F (36.9 °C)   SpO2: 95%   Weight: 115 kg (254 lb 6.6 oz)   Height: 182.9 cm (72.01\")   PainSc:   6   PainLoc: Foot       Physical Exam   Constitutional: He is oriented to person, place, and time. Vital signs are normal. He appears well-developed and well-nourished.  Non-toxic appearance. No distress.   HENT:   Head: Normocephalic and atraumatic.   Right Ear: External ear normal.   Left Ear: External ear normal.   Nose: Nose normal.   Mouth/Throat: Oropharynx is clear and moist.   Eyes: Conjunctivae and EOM are normal.   Neck: Trachea normal. Neck supple.   Cardiovascular: Normal rate.   Pulmonary/Chest: Effort normal. No respiratory distress.   Musculoskeletal:        Right foot: There is tenderness. There is normal range of motion, no swelling and no deformity.        Left foot: There is tenderness. There is normal range of motion, no swelling and no deformity.   Wearing shoes and socks   Neurological: He is alert and oriented to person, place, and time. Gait normal.   dysthesias " of BLE's    Skin: Skin is warm and dry. No rash noted. He is not diaphoretic.   Psychiatric: He has a normal mood and affect. His speech is normal and behavior is normal. Thought content normal. Cognition and memory are normal.   Nursing note and vitals reviewed.    Ortho Exam  Neurologic Exam     Mental Status   Oriented to person, place, and time.   Speech: speech is normal     Cranial Nerves     CN III, IV, VI   Extraocular motions are normal.       Lab Results   Component Value Date    POCMETH Negative 04/10/2018    POCAMPHET Negative 04/10/2018    POCBARBITUR Negative 04/10/2018    POCBENZO Negative 04/10/2018    POCCOCAINE Negative 04/10/2018    POCMETHADO Negative 04/10/2018    POCOPIATES Positive (A) 04/10/2018    POCOXYCODO Negative 04/10/2018    POCPHENCYC Negative 04/10/2018    POCPROPOXY Negative 04/10/2018    POCTHC Negative 04/10/2018    POCTRICYC Negative 04/10/2018     Last UDS results reviewed: 05/14/19   Last UDS: 4/2018  Comments: Consistent     Date of last RONALD reviewed : 05/14/19   Comments: Axel         Tello was seen today for foot pain.    Diagnoses and all orders for this visit:    Idiopathic peripheral neuropathy  -     HYDROcodone-acetaminophen (NORCO) 7.5-325 MG per tablet; Take 1 tablet po 2-3 times a day prn pain    Other chronic pain  -     HYDROcodone-acetaminophen (NORCO) 7.5-325 MG per tablet; Take 1 tablet po 2-3 times a day prn pain      Requested Prescriptions     Signed Prescriptions Disp Refills   • HYDROcodone-acetaminophen (NORCO) 7.5-325 MG per tablet 75 tablet 0     Sig: Take 1 tablet po 2-3 times a day prn pain      - Patient appears stable with current regimen. No adverse effects. Regarding continuation of opioids, there is no evidence of aberrant behavior or any red flags. The patient continues with appropriate response to opioid therapy. ADL's remain intact by self.    - Continue norco of 7.5/325 mg. #75  - Last UDS performed 10/2018- was reviewed and  consistent. Random urine drug screen per office policy today, to be checked at next visit. .   - Continue home exercise program.   - Discussed with the patient regarding long-term side effects of opioids including but not limited to opioid induced hormonal suppression, hyperalgesia, possible opioid induced altered immune system, and elevated risk of myocardial infarction.  - is considering SCS system for PN. BS handout given. asking to move forward today. Will place psych referral and education with company.   - wants to try lyrica once it goes generic - can not afford, even with co pay card.       Wt Readings from Last 3 Encounters:   05/14/19 115 kg (254 lb 6.6 oz)   02/05/19 118 kg (260 lb 6.4 oz)   12/06/18 115 kg (254 lb)     Body mass index is 34.49 kg/m².  weight loss of 6 lbs over the past 3 month(s)    Intentional?  Yes Patient counseled on the importance of weight loss to help with overall health and pain control. Patient instructed to attempt weight loss.   Plan: Calorie counting  Trying to eat healthier, less desserts    Follow-up in 2 months.    Marlin Blanco MD  Pain Management     [facility] REPORT  As part of the patient's treatment plan, I am prescribing controlled substances. The patient has been made aware of appropriate use of such medications, including potential risk of somnolence, limited ability to drive and/or work safely, and the potential for dependence or overdose. It has also bee made clear that these medications are for use by this patient only, without concomitant use of alcohol or other substances unless prescribed.   Patient has completed prescribing agreement detailing terms of continued prescribing of controlled substances, including monitoring RONALD reports, urine drug screening, and pill counts if necessary. The patient is aware that inappropriate use will results in cessation of prescribing such medications.  RONALD report has been reviewed and scanned into the patient's  chart.  History and physical exam exhibit continued safe and appropriate use of controlled substances.

## 2019-06-12 DIAGNOSIS — G89.29 OTHER CHRONIC PAIN: ICD-10-CM

## 2019-06-12 DIAGNOSIS — G60.9 IDIOPATHIC PERIPHERAL NEUROPATHY: ICD-10-CM

## 2019-06-12 NOTE — TELEPHONE ENCOUNTER
Medication Refill Request    Date of phone call: 19    Medication being requested: Norco 7.5-325 mg  si tab po 2-3 times a day prn  Qty: 75    Date of last visit: 19    Date of last refill: 19    RONALD up to date?: yes    Next Follow up?: 7/15/19    Any new pertinent information? (i.e, new medication allergies, new use of medications, change in patient's health or condition, non-compliance or inconsistency with prescribing agreement?): Patient was a Dr. Blanco patient.

## 2019-06-13 RX ORDER — HYDROCODONE BITARTRATE AND ACETAMINOPHEN 7.5; 325 MG/1; MG/1
TABLET ORAL
Qty: 75 TABLET | Refills: 0 | Status: SHIPPED | OUTPATIENT
Start: 2019-06-13

## 2021-08-23 ENCOUNTER — OFFICE VISIT (OUTPATIENT)
Dept: ORTHOPEDIC SURGERY | Facility: CLINIC | Age: 56
End: 2021-08-23

## 2021-08-23 VITALS — WEIGHT: 240 LBS | BODY MASS INDEX: 31.81 KG/M2 | TEMPERATURE: 97.1 F | HEIGHT: 73 IN

## 2021-08-23 DIAGNOSIS — M17.10 ARTHRITIS OF KNEE: ICD-10-CM

## 2021-08-23 DIAGNOSIS — M25.562 LEFT KNEE PAIN, UNSPECIFIED CHRONICITY: Primary | ICD-10-CM

## 2021-08-23 PROCEDURE — 73562 X-RAY EXAM OF KNEE 3: CPT | Performed by: ORTHOPAEDIC SURGERY

## 2021-08-23 PROCEDURE — 99203 OFFICE O/P NEW LOW 30 MIN: CPT | Performed by: ORTHOPAEDIC SURGERY

## 2021-08-23 RX ORDER — MELOXICAM 15 MG/1
15 TABLET ORAL DAILY PRN
Qty: 30 TABLET | Refills: 2 | Status: SHIPPED | OUTPATIENT
Start: 2021-08-23 | End: 2021-11-29

## 2021-08-23 NOTE — PROGRESS NOTES
Patient: Tello Eller    YOB: 1965    Medical Record Number: [id number]    Chief Complaints:  Left knee pain    History of Present Illness:     56 y.o. male patient who presents for evaluation of left knee pain.  He reports that the symptoms first began about 2 years ago.  The symptoms come and go.  He reports that he has good days and bad days.  Today is a good day.  Current pain is described as very mild and aching.  His pain is predominantly along the medial side of the knee.  Denies any clicking, popping or catching.  Symptoms are worse with activity.  Symptoms are somewhat better with rest and anti-inflammatories.  Denies any shooting pain down the legs or weakness.  He does have chronic numbness and paresthesias in both feet due to peripheral neuropathy.  He thinks he has a meniscus tear.  He has a friend who had a meniscus tear that I scoped and cleaned out.  He says his symptoms are similar.    Allergies: No Known Allergies    Home Medications    Current Outpatient Medications:   •  HYDROcodone-acetaminophen (NORCO) 7.5-325 MG per tablet, Take 1 tablet po 2-3 times a day prn pain, Disp: 75 tablet, Rfl: 0  •  ibuprofen (ADVIL,MOTRIN) 200 MG tablet, Take 800 mg by mouth 2 (Two) Times a Day As Needed for Mild Pain ., Disp: , Rfl:   •  naproxen sodium (ALEVE) 220 MG tablet, Take 440 mg by mouth 2 (Two) Times a Day As Needed., Disp: , Rfl:     Past Medical History:   Diagnosis Date   • Extremity pain    • Hyperlipidemia    • Hypertension    • Low back pain    • Peripheral neuropathy    • Ruptured lumbar intervertebral disc        Past Surgical History:   Procedure Laterality Date   • TONSILLECTOMY         Social History     Occupational History   • Not on file   Tobacco Use   • Smoking status: Former Smoker   • Smokeless tobacco: Never Used   Vaping Use   • Vaping Use: Never used   Substance and Sexual Activity   • Alcohol use: Yes     Comment: occasional   • Drug use: No   • Sexual activity:  "Defer      Social History     Social History Narrative   • Not on file       Family History   Problem Relation Age of Onset   • Heart disease Mother    • Diabetes Mother    • Cancer Father        Review of Systems:      Constitutional: Denies fever, shaking or chills   Eyes: Denies change in visual acuity   HEENT: Denies nasal congestion or sore throat   Respiratory: Denies cough or shortness of breath   Cardiovascular: Denies chest pain or edema  Endocrine: Denies tremors, palpitations, intolerance of heat or cold, polyuria, polydipsia.  GI: Denies abdominal pain, nausea, vomiting, bloody stools or diarrhea  : Denies frequency, urgency, incontinence, retention, or nocturia.  Musculoskeletal: Denies numbness, tingling or loss of motor function except as above  Integument: Denies rash, lesion or ulceration   Neurologic: Denies headache or focal weakness, deficits  Heme: Denies spontaneous or excessive bleeding, epistaxis, hematuria, melena, fatigue, enlarged or tender lymph nodes.      All other pertinent positives and negatives as noted above in HPI.    Physical Exam:   56 y.o. male  Vitals:    08/23/21 1321   Temp: 97.1 °F (36.2 °C)   Weight: 109 kg (240 lb)   Height: 185.4 cm (73\")     General:  Patient is awake and alert.  Appears in no acute distress or discomfort.    Psych:  Affect and demeanor are appropriate.    Eyes:  Conjunctiva and sclera appear grossly normal.  Eyes track well and EOM seem to be intact.    Ears:  No gross abnormalities.  Hearing adequate for the exam.    Cardiovascular:  Regular rate and rhythm.    Lungs:  Good chest expansion.  Breathing unlabored.    Spine:  Back appears grossly normal.  No palpable masses or adenopathy.  Good motion.  Straight leg raise and crossed straight leg raise maneuver are both negative for lower leg and/or knee pain.    Extremities:  Left knee is examined.  Skin is benign.  No obvious gross abnormalities.  Trace effusion.  No palpable masses or adenopathy.  " Mild tenderness noted over medial joint line.  Motion is to 125 degrees of flexion, full extension.  No instability.  Strength is well preserved including hip flexion, knee extension, ankle and toe plantarflexion, ankle inversion and eversion.  Good sensory function throughout the leg and foot.  Palpable pulses.  Brisk capillary refill.  Good skin turgor.         Radiology:   Bilateral standing AP views, bilateral merchants views and a lateral view of the left knee are ordered by myself and reviewed to evaluate the patient's complaint.  No comparison films are immediately available.  The x-rays show moderate medial compartment degenerative arthritis including joint space narrowing, osteophyte formation, and subchondral sclerosis.     Assessment/Plan:  Left knee osteoarthritis    We discussed treatment options in detail including the risks, benefits, and alternatives of conservative treatment versus surgical options.  Regarding conservative treatment, we discussed appropriate activity modifications, anti-inflammatories, injections (including both corticosteroids and visco supplementation), and physical therapy.  His pain is not bad enough today to justify an injection in my opinion.  I suggest we start with a prescription strength anti-inflammatory.  I gave him a prescription for meloxicam.  Risk of this medicine were discussed.  I told him to call me if his pain worsens and I will be happy to see him back to reevaluate.    Onel Moe MD    08/23/2021    CC to Myles Eller MD

## 2021-11-29 RX ORDER — MELOXICAM 15 MG/1
15 TABLET ORAL DAILY PRN
Qty: 30 TABLET | Refills: 2 | Status: SHIPPED | OUTPATIENT
Start: 2021-11-29 | End: 2023-03-08

## 2022-03-15 ENCOUNTER — TELEPHONE (OUTPATIENT)
Dept: ORTHOPEDIC SURGERY | Facility: CLINIC | Age: 57
End: 2022-03-15

## 2022-03-15 NOTE — TELEPHONE ENCOUNTER
Caller: Tello Eller    Relationship to patient: Self    Best call back number: [phone]    Patient is needing: CALLBACK TO SCHEDULE CORTISONE INJECTION LT KNEE    UNABLE TO WARM TRANSFER

## 2022-04-18 ENCOUNTER — CLINICAL SUPPORT (OUTPATIENT)
Dept: ORTHOPEDIC SURGERY | Facility: CLINIC | Age: 57
End: 2022-04-18

## 2022-04-18 VITALS — WEIGHT: 240 LBS | HEIGHT: 73 IN | TEMPERATURE: 97.7 F | BODY MASS INDEX: 31.81 KG/M2

## 2022-04-18 DIAGNOSIS — M17.10 ARTHRITIS OF KNEE: Primary | ICD-10-CM

## 2022-04-18 PROCEDURE — 20610 DRAIN/INJ JOINT/BURSA W/O US: CPT | Performed by: ORTHOPAEDIC SURGERY

## 2022-04-18 RX ORDER — METHYLPREDNISOLONE ACETATE 80 MG/ML
80 INJECTION, SUSPENSION INTRA-ARTICULAR; INTRALESIONAL; INTRAMUSCULAR; SOFT TISSUE
Status: COMPLETED | OUTPATIENT
Start: 2022-04-18 | End: 2022-04-18

## 2022-04-18 RX ADMIN — METHYLPREDNISOLONE ACETATE 80 MG: 80 INJECTION, SUSPENSION INTRA-ARTICULAR; INTRALESIONAL; INTRAMUSCULAR; SOFT TISSUE at 13:17

## 2022-04-18 NOTE — PROGRESS NOTES
Mr. Eller comes in today for follow-up of his left knee.  He would like to try an injection today.  The risks, benefits and alternatives were discussed and he consented.  Going forward, he will follow-up as needed.    Onel Moe MD    04/18/2022    Large Joint Arthrocentesis: L knee  Date/Time: 4/18/2022 1:17 PM  Consent given by: patient  Site marked: site marked  Timeout: Immediately prior to procedure a time out was called to verify the correct patient, procedure, equipment, support staff and site/side marked as required   Supporting Documentation  Indications: pain   Procedure Details  Location: knee - L knee  Preparation: Patient was prepped and draped in the usual sterile fashion  Needle gauge: 21.  Approach: anterolateral  Medications administered: 2 mL lidocaine (cardiac); 80 mg methylPREDNISolone acetate 80 MG/ML  Patient tolerance: patient tolerated the procedure well with no immediate complications

## 2022-07-27 ENCOUNTER — TELEPHONE (OUTPATIENT)
Dept: ORTHOPEDIC SURGERY | Facility: CLINIC | Age: 57
End: 2022-07-27

## 2022-07-27 NOTE — TELEPHONE ENCOUNTER
Caller: PATIENT     Relationship to patient: SELF       Best call back number: [phone]      Chief complaint: LEFT KNEE PAIN      Type of visit: CORTISONE INJECTION        Additional notes PT.CALLING TO SCHEDULE CORTISONE INJECTION FOR LEFT KNEE.

## 2022-08-08 ENCOUNTER — CLINICAL SUPPORT (OUTPATIENT)
Dept: ORTHOPEDIC SURGERY | Facility: CLINIC | Age: 57
End: 2022-08-08

## 2022-08-08 VITALS — WEIGHT: 240 LBS | TEMPERATURE: 98.2 F | HEIGHT: 73 IN | BODY MASS INDEX: 31.81 KG/M2

## 2022-08-08 DIAGNOSIS — M17.10 ARTHRITIS OF KNEE: Primary | ICD-10-CM

## 2022-08-08 PROCEDURE — 20610 DRAIN/INJ JOINT/BURSA W/O US: CPT | Performed by: NURSE PRACTITIONER

## 2022-08-08 RX ORDER — LIDOCAINE HYDROCHLORIDE 10 MG/ML
2 INJECTION, SOLUTION EPIDURAL; INFILTRATION; INTRACAUDAL; PERINEURAL
Status: COMPLETED | OUTPATIENT
Start: 2022-08-08 | End: 2022-08-08

## 2022-08-08 RX ORDER — METHYLPREDNISOLONE ACETATE 80 MG/ML
80 INJECTION, SUSPENSION INTRA-ARTICULAR; INTRALESIONAL; INTRAMUSCULAR; SOFT TISSUE
Status: COMPLETED | OUTPATIENT
Start: 2022-08-08 | End: 2022-08-08

## 2022-08-08 RX ADMIN — METHYLPREDNISOLONE ACETATE 80 MG: 80 INJECTION, SUSPENSION INTRA-ARTICULAR; INTRALESIONAL; INTRAMUSCULAR; SOFT TISSUE at 09:23

## 2022-08-08 RX ADMIN — LIDOCAINE HYDROCHLORIDE 2 ML: 10 INJECTION, SOLUTION EPIDURAL; INFILTRATION; INTRACAUDAL; PERINEURAL at 09:23

## 2023-03-02 ENCOUNTER — TELEPHONE (OUTPATIENT)
Dept: ORTHOPEDIC SURGERY | Facility: CLINIC | Age: 58
End: 2023-03-02

## 2023-03-02 NOTE — TELEPHONE ENCOUNTER
Caller: Tello Eller    Relationship to patient: Self    Best call back number: [phone]    Chief complaint: LT KNEE    Type of visit: INJECTION

## 2023-03-08 ENCOUNTER — CLINICAL SUPPORT (OUTPATIENT)
Dept: ORTHOPEDIC SURGERY | Facility: CLINIC | Age: 58
End: 2023-03-08
Payer: COMMERCIAL

## 2023-03-08 VITALS — WEIGHT: 240.1 LBS | HEIGHT: 73 IN | TEMPERATURE: 97.8 F | BODY MASS INDEX: 31.82 KG/M2

## 2023-03-08 DIAGNOSIS — M25.562 CHRONIC PAIN OF LEFT KNEE: Primary | ICD-10-CM

## 2023-03-08 DIAGNOSIS — M17.10 ARTHRITIS OF KNEE: ICD-10-CM

## 2023-03-08 DIAGNOSIS — G89.29 CHRONIC PAIN OF LEFT KNEE: Primary | ICD-10-CM

## 2023-03-08 PROCEDURE — 20610 DRAIN/INJ JOINT/BURSA W/O US: CPT | Performed by: NURSE PRACTITIONER

## 2023-03-08 PROCEDURE — 73562 X-RAY EXAM OF KNEE 3: CPT | Performed by: NURSE PRACTITIONER

## 2023-03-08 RX ORDER — METHYLPREDNISOLONE ACETATE 80 MG/ML
80 INJECTION, SUSPENSION INTRA-ARTICULAR; INTRALESIONAL; INTRAMUSCULAR; SOFT TISSUE
Status: COMPLETED | OUTPATIENT
Start: 2023-03-08 | End: 2023-03-08

## 2023-03-08 RX ORDER — LIDOCAINE HYDROCHLORIDE 10 MG/ML
1 INJECTION, SOLUTION EPIDURAL; INFILTRATION; INTRACAUDAL; PERINEURAL
Status: COMPLETED | OUTPATIENT
Start: 2023-03-08 | End: 2023-03-08

## 2023-03-08 RX ADMIN — LIDOCAINE HYDROCHLORIDE 1 ML: 10 INJECTION, SOLUTION EPIDURAL; INFILTRATION; INTRACAUDAL; PERINEURAL at 16:32

## 2023-03-08 RX ADMIN — METHYLPREDNISOLONE ACETATE 80 MG: 80 INJECTION, SUSPENSION INTRA-ARTICULAR; INTRALESIONAL; INTRAMUSCULAR; SOFT TISSUE at 16:32

## 2023-03-08 NOTE — PROGRESS NOTES
Mr. Eller comes in today for follow-up.  Injections have worked well in the past.  The patient would like to get a repeat injection today.      Bilateral standing AP views, bilateral merchants views and a lateral view of the left knee are ordered by myself and reviewed to evaluate the patient's complaint. These are compared to previous x-rays.  The x-rays show moderate degenerative arthritis including joint space narrowing, osteophyte formation, and subchondral sclerosis.  The majority of the degenerative changes appear to involve the medial compartment.  No significant changes compared to previous films.    The risks, benefits and alternatives were discussed and the patient consented.  Going forward, the patient will follow-up as needed.    STEPHEN Whitmore    03/08/2023      Large Joint Arthrocentesis: L knee  Date/Time: 3/8/2023 4:32 PM  Consent given by: patient  Site marked: site marked  Timeout: Immediately prior to procedure a time out was called to verify the correct patient, procedure, equipment, support staff and site/side marked as required   Supporting Documentation  Indications: pain   Procedure Details  Location: knee - L knee  Preparation: Patient was prepped and draped in the usual sterile fashion  Needle gauge: 21 G.  Approach: anterolateral  Medications administered: 80 mg methylPREDNISolone acetate 80 MG/ML; 1 mL lidocaine PF 1% 1 %  Patient tolerance: patient tolerated the procedure well with no immediate complications

## 2023-12-18 ENCOUNTER — OFFICE VISIT (OUTPATIENT)
Dept: ORTHOPEDIC SURGERY | Facility: CLINIC | Age: 58
End: 2023-12-18
Payer: COMMERCIAL

## 2023-12-18 VITALS — TEMPERATURE: 98.2 F | HEIGHT: 73 IN | WEIGHT: 249.4 LBS | BODY MASS INDEX: 33.05 KG/M2

## 2023-12-18 DIAGNOSIS — G89.29 CHRONIC PAIN OF LEFT KNEE: Primary | ICD-10-CM

## 2023-12-18 DIAGNOSIS — M17.10 ARTHRITIS OF KNEE: ICD-10-CM

## 2023-12-18 DIAGNOSIS — M25.562 CHRONIC PAIN OF LEFT KNEE: Primary | ICD-10-CM

## 2023-12-18 RX ORDER — DICLOFENAC SODIUM 30 MG/G
GEL TOPICAL
COMMUNITY
Start: 2023-12-05

## 2023-12-18 RX ORDER — OXYCODONE AND ACETAMINOPHEN 7.5; 325 MG/1; MG/1
TABLET ORAL
COMMUNITY
Start: 2023-12-03

## 2023-12-18 RX ORDER — LIDOCAINE HYDROCHLORIDE 10 MG/ML
2 INJECTION, SOLUTION EPIDURAL; INFILTRATION; INTRACAUDAL; PERINEURAL
Status: COMPLETED | OUTPATIENT
Start: 2023-12-18 | End: 2023-12-18

## 2023-12-18 RX ORDER — METHYLPREDNISOLONE ACETATE 80 MG/ML
1 INJECTION, SUSPENSION INTRA-ARTICULAR; INTRALESIONAL; INTRAMUSCULAR; SOFT TISSUE
Status: COMPLETED | OUTPATIENT
Start: 2023-12-18 | End: 2023-12-18

## 2023-12-18 RX ADMIN — LIDOCAINE HYDROCHLORIDE 2 ML: 10 INJECTION, SOLUTION EPIDURAL; INFILTRATION; INTRACAUDAL; PERINEURAL at 16:14

## 2023-12-18 RX ADMIN — METHYLPREDNISOLONE ACETATE 1 ML: 80 INJECTION, SUSPENSION INTRA-ARTICULAR; INTRALESIONAL; INTRAMUSCULAR; SOFT TISSUE at 16:14

## 2023-12-18 NOTE — PROGRESS NOTES
Mr. Eller comes in today for follow-up.  Injections have worked well in the past.  The patient would like to get a repeat injection today.  The risks, benefits and alternatives were discussed and the patient consented.  Going forward, the patient will follow-up as needed.    STEPHEN Whitmore    12/18/2023      Large Joint Arthrocentesis: L knee  Date/Time: 12/18/2023 4:14 PM  Consent given by: patient  Site marked: site marked  Timeout: Immediately prior to procedure a time out was called to verify the correct patient, procedure, equipment, support staff and site/side marked as required   Supporting Documentation  Indications: pain   Procedure Details  Location: knee - L knee  Preparation: Patient was prepped and draped in the usual sterile fashion  Needle gauge: 21G.  Approach: anterolateral  Medications administered: 1 mL methylPREDNISolone acetate 80 MG/ML; 2 mL lidocaine PF 1% 1 %  Patient tolerance: patient tolerated the procedure well with no immediate complications

## 2024-06-03 ENCOUNTER — OFFICE VISIT (OUTPATIENT)
Dept: ORTHOPEDIC SURGERY | Facility: CLINIC | Age: 59
End: 2024-06-03
Payer: COMMERCIAL

## 2024-06-03 VITALS — WEIGHT: 245.8 LBS | TEMPERATURE: 98.7 F | BODY MASS INDEX: 32.58 KG/M2 | HEIGHT: 73 IN

## 2024-06-03 DIAGNOSIS — G89.29 CHRONIC PAIN OF LEFT KNEE: Primary | ICD-10-CM

## 2024-06-03 DIAGNOSIS — M25.562 CHRONIC PAIN OF LEFT KNEE: Primary | ICD-10-CM

## 2024-06-03 DIAGNOSIS — Z76.89 ENCOUNTER TO ESTABLISH CARE: ICD-10-CM

## 2024-06-03 PROCEDURE — 73562 X-RAY EXAM OF KNEE 3: CPT | Performed by: NURSE PRACTITIONER

## 2024-06-03 PROCEDURE — 20610 DRAIN/INJ JOINT/BURSA W/O US: CPT | Performed by: NURSE PRACTITIONER

## 2024-06-03 RX ORDER — LIDOCAINE HYDROCHLORIDE 10 MG/ML
2 INJECTION, SOLUTION EPIDURAL; INFILTRATION; INTRACAUDAL; PERINEURAL
Status: COMPLETED | OUTPATIENT
Start: 2024-06-03 | End: 2024-06-03

## 2024-06-03 RX ORDER — METHYLPREDNISOLONE ACETATE 80 MG/ML
1 INJECTION, SUSPENSION INTRA-ARTICULAR; INTRALESIONAL; INTRAMUSCULAR; SOFT TISSUE
Status: COMPLETED | OUTPATIENT
Start: 2024-06-03 | End: 2024-06-03

## 2024-06-03 RX ADMIN — METHYLPREDNISOLONE ACETATE 1 ML: 80 INJECTION, SUSPENSION INTRA-ARTICULAR; INTRALESIONAL; INTRAMUSCULAR; SOFT TISSUE at 12:59

## 2024-06-03 RX ADMIN — LIDOCAINE HYDROCHLORIDE 2 ML: 10 INJECTION, SOLUTION EPIDURAL; INFILTRATION; INTRACAUDAL; PERINEURAL at 12:59

## 2024-06-03 NOTE — PROGRESS NOTES
Mr. Eller comes in today for follow-up.  Injections have worked well in the past.  The patient would like to get a repeat injection today.      Imaging:  Bilateral standing AP views, bilateral merchants views and a lateral view of the left knee are ordered by myself and reviewed to evaluate the patient's complaint.  No comparison films are immediately available.  The x-rays show significant degenerative arthritis including joint space narrowing, osteophyte formation, and subchondral sclerosis.  The majority of the degenerative changes appear to involve the medial compartment.  Mild progressive changes note compare to previous x-rays from 2023.    The risks, benefits and alternatives were discussed and the patient consented.  Of note, he mentions he is in need of a primary care provider.  I have entered this referral for him.  Going forward, the patient will follow-up as needed.    STEPHEN Whitmore    06/03/2024      Large Joint Arthrocentesis: L knee  Date/Time: 6/3/2024 12:59 PM  Consent given by: patient  Site marked: site marked  Timeout: Immediately prior to procedure a time out was called to verify the correct patient, procedure, equipment, support staff and site/side marked as required   Supporting Documentation  Indications: pain   Procedure Details  Location: knee - L knee  Preparation: Patient was prepped and draped in the usual sterile fashion  Needle gauge: 21G.  Approach: anterolateral  Medications administered: 1 mL methylPREDNISolone acetate 80 MG/ML; 2 mL lidocaine PF 1% 1 %  Patient tolerance: patient tolerated the procedure well with no immediate complications

## 2024-12-16 ENCOUNTER — OFFICE VISIT (OUTPATIENT)
Dept: ORTHOPEDIC SURGERY | Facility: CLINIC | Age: 59
End: 2024-12-16
Payer: COMMERCIAL

## 2024-12-16 VITALS — WEIGHT: 232.8 LBS | HEIGHT: 73 IN | BODY MASS INDEX: 30.85 KG/M2 | TEMPERATURE: 97.5 F

## 2024-12-16 DIAGNOSIS — M25.562 CHRONIC PAIN OF LEFT KNEE: Primary | ICD-10-CM

## 2024-12-16 DIAGNOSIS — M17.10 ARTHRITIS OF KNEE: ICD-10-CM

## 2024-12-16 DIAGNOSIS — G89.29 CHRONIC PAIN OF LEFT KNEE: Primary | ICD-10-CM

## 2024-12-16 PROCEDURE — 20610 DRAIN/INJ JOINT/BURSA W/O US: CPT | Performed by: NURSE PRACTITIONER

## 2024-12-16 RX ORDER — PANTOPRAZOLE SODIUM 40 MG/1
40 TABLET, DELAYED RELEASE ORAL
COMMUNITY
Start: 2024-07-24

## 2024-12-16 RX ORDER — ZINC SULFATE 50(220)MG
220 CAPSULE ORAL DAILY
COMMUNITY
Start: 2024-07-25

## 2024-12-16 RX ORDER — METHYLPREDNISOLONE ACETATE 80 MG/ML
1 INJECTION, SUSPENSION INTRA-ARTICULAR; INTRALESIONAL; INTRAMUSCULAR; SOFT TISSUE
Status: COMPLETED | OUTPATIENT
Start: 2024-12-16 | End: 2024-12-16

## 2024-12-16 RX ORDER — LIDOCAINE HYDROCHLORIDE 10 MG/ML
2 INJECTION, SOLUTION EPIDURAL; INFILTRATION; INTRACAUDAL; PERINEURAL
Status: COMPLETED | OUTPATIENT
Start: 2024-12-16 | End: 2024-12-16

## 2024-12-16 RX ORDER — OXYCODONE AND ACETAMINOPHEN 7.5; 325 MG/1; MG/1
1 TABLET ORAL
COMMUNITY
Start: 2024-07-24

## 2024-12-16 RX ADMIN — METHYLPREDNISOLONE ACETATE 1 ML: 80 INJECTION, SUSPENSION INTRA-ARTICULAR; INTRALESIONAL; INTRAMUSCULAR; SOFT TISSUE at 11:26

## 2024-12-16 RX ADMIN — LIDOCAINE HYDROCHLORIDE 2 ML: 10 INJECTION, SOLUTION EPIDURAL; INFILTRATION; INTRACAUDAL; PERINEURAL at 11:26

## 2024-12-16 NOTE — PROGRESS NOTES
Mr. Eller comes in today for follow-up.  He reports the injection in June provided transient relief lasting for approximately 1 month.  He would like to get a repeat injection today.  The risks, benefits and alternatives were discussed and the patient consented.  Going forward, the patient will follow-up as needed.    STEPHEN Whitmore    12/16/2024      Large Joint Arthrocentesis: L knee  Date/Time: 12/16/2024 11:26 AM  Consent given by: patient  Site marked: site marked  Timeout: Immediately prior to procedure a time out was called to verify the correct patient, procedure, equipment, support staff and site/side marked as required   Supporting Documentation  Indications: pain   Procedure Details  Location: knee - L knee  Preparation: Patient was prepped and draped in the usual sterile fashion  Needle gauge: 21G.  Approach: anterolateral  Medications administered: 1 mL methylPREDNISolone acetate 80 MG/ML; 2 mL lidocaine PF 1% 1 %  Patient tolerance: patient tolerated the procedure well with no immediate complications